# Patient Record
Sex: FEMALE | Race: ASIAN | Employment: UNEMPLOYED | URBAN - METROPOLITAN AREA
[De-identification: names, ages, dates, MRNs, and addresses within clinical notes are randomized per-mention and may not be internally consistent; named-entity substitution may affect disease eponyms.]

---

## 2021-09-17 ENCOUNTER — OFFICE VISIT (OUTPATIENT)
Dept: FAMILY MEDICINE | Facility: CLINIC | Age: 61
End: 2021-09-17
Payer: COMMERCIAL

## 2021-09-17 VITALS
OXYGEN SATURATION: 97 % | BODY MASS INDEX: 19.54 KG/M2 | DIASTOLIC BLOOD PRESSURE: 94 MMHG | TEMPERATURE: 97.6 F | HEIGHT: 61 IN | SYSTOLIC BLOOD PRESSURE: 148 MMHG | HEART RATE: 69 BPM | WEIGHT: 103.5 LBS

## 2021-09-17 DIAGNOSIS — Z00.00 WELLNESS EXAMINATION: Primary | ICD-10-CM

## 2021-09-17 DIAGNOSIS — Z12.11 COLON CANCER SCREENING: ICD-10-CM

## 2021-09-17 DIAGNOSIS — I10 BENIGN ESSENTIAL HYPERTENSION: ICD-10-CM

## 2021-09-17 LAB
ALBUMIN SERPL-MCNC: 3.9 G/DL (ref 3.4–5)
ALP SERPL-CCNC: 89 U/L (ref 40–150)
ALT SERPL W P-5'-P-CCNC: 27 U/L (ref 0–50)
ANION GAP SERPL CALCULATED.3IONS-SCNC: 5 MMOL/L (ref 3–14)
AST SERPL W P-5'-P-CCNC: 19 U/L (ref 0–45)
BASOPHILS # BLD AUTO: 0 10E3/UL (ref 0–0.2)
BASOPHILS NFR BLD AUTO: 0 %
BILIRUB SERPL-MCNC: 0.4 MG/DL (ref 0.2–1.3)
BUN SERPL-MCNC: 15 MG/DL (ref 7–30)
CALCIUM SERPL-MCNC: 9.7 MG/DL (ref 8.5–10.1)
CHLORIDE BLD-SCNC: 108 MMOL/L (ref 94–109)
CHOLEST SERPL-MCNC: 205 MG/DL
CLUE CELLS: ABNORMAL
CO2 SERPL-SCNC: 28 MMOL/L (ref 20–32)
CREAT SERPL-MCNC: 0.67 MG/DL (ref 0.52–1.04)
EOSINOPHIL # BLD AUTO: 0.3 10E3/UL (ref 0–0.7)
EOSINOPHIL NFR BLD AUTO: 4 %
ERYTHROCYTE [DISTWIDTH] IN BLOOD BY AUTOMATED COUNT: 11.8 % (ref 10–15)
FASTING STATUS PATIENT QL REPORTED: YES
GFR SERPL CREATININE-BSD FRML MDRD: >90 ML/MIN/1.73M2
GLUCOSE BLD-MCNC: 96 MG/DL (ref 70–99)
HBV CORE IGM SERPL QL IA: NONREACTIVE
HBV SURFACE AG SERPL QL IA: NONREACTIVE
HCT VFR BLD AUTO: 36.4 % (ref 35–47)
HCV AB SERPL QL IA: NONREACTIVE
HDLC SERPL-MCNC: 39 MG/DL
HGB BLD-MCNC: 12.2 G/DL (ref 11.7–15.7)
HIV 1+2 AB+HIV1 P24 AG SERPL QL IA: NONREACTIVE
LDLC SERPL CALC-MCNC: 141 MG/DL
LYMPHOCYTES # BLD AUTO: 2.2 10E3/UL (ref 0.8–5.3)
LYMPHOCYTES NFR BLD AUTO: 36 %
MCH RBC QN AUTO: 29 PG (ref 26.5–33)
MCHC RBC AUTO-ENTMCNC: 33.5 G/DL (ref 31.5–36.5)
MCV RBC AUTO: 87 FL (ref 78–100)
MONOCYTES # BLD AUTO: 0.5 10E3/UL (ref 0–1.3)
MONOCYTES NFR BLD AUTO: 8 %
NEUTROPHILS # BLD AUTO: 3.2 10E3/UL (ref 1.6–8.3)
NEUTROPHILS NFR BLD AUTO: 52 %
NONHDLC SERPL-MCNC: 166 MG/DL
PLATELET # BLD AUTO: 264 10E3/UL (ref 150–450)
POTASSIUM BLD-SCNC: 4.1 MMOL/L (ref 3.4–5.3)
PROT SERPL-MCNC: 8.8 G/DL (ref 6.8–8.8)
RBC # BLD AUTO: 4.2 10E6/UL (ref 3.8–5.2)
SODIUM SERPL-SCNC: 141 MMOL/L (ref 133–144)
TRICHOMONAS, WET PREP: ABNORMAL
TRIGL SERPL-MCNC: 127 MG/DL
TSH SERPL DL<=0.005 MIU/L-ACNC: 2.32 MU/L (ref 0.4–4)
WBC # BLD AUTO: 6.2 10E3/UL (ref 4–11)
WBC'S/HIGH POWER FIELD, WET PREP: ABNORMAL
YEAST, WET PREP: ABNORMAL

## 2021-09-17 PROCEDURE — 80050 GENERAL HEALTH PANEL: CPT | Performed by: NURSE PRACTITIONER

## 2021-09-17 PROCEDURE — G0145 SCR C/V CYTO,THINLAYER,RESCR: HCPCS | Performed by: NURSE PRACTITIONER

## 2021-09-17 PROCEDURE — 36415 COLL VENOUS BLD VENIPUNCTURE: CPT | Performed by: NURSE PRACTITIONER

## 2021-09-17 PROCEDURE — 99386 PREV VISIT NEW AGE 40-64: CPT | Performed by: NURSE PRACTITIONER

## 2021-09-17 PROCEDURE — 87491 CHLMYD TRACH DNA AMP PROBE: CPT | Performed by: NURSE PRACTITIONER

## 2021-09-17 PROCEDURE — 86481 TB AG RESPONSE T-CELL SUSP: CPT | Performed by: NURSE PRACTITIONER

## 2021-09-17 PROCEDURE — 86705 HEP B CORE ANTIBODY IGM: CPT | Performed by: NURSE PRACTITIONER

## 2021-09-17 PROCEDURE — G0124 SCREEN C/V THIN LAYER BY MD: HCPCS | Performed by: PATHOLOGY

## 2021-09-17 PROCEDURE — 87389 HIV-1 AG W/HIV-1&-2 AB AG IA: CPT | Performed by: NURSE PRACTITIONER

## 2021-09-17 PROCEDURE — 80061 LIPID PANEL: CPT | Performed by: NURSE PRACTITIONER

## 2021-09-17 PROCEDURE — 87210 SMEAR WET MOUNT SALINE/INK: CPT | Performed by: NURSE PRACTITIONER

## 2021-09-17 PROCEDURE — 87340 HEPATITIS B SURFACE AG IA: CPT | Performed by: NURSE PRACTITIONER

## 2021-09-17 PROCEDURE — 87591 N.GONORRHOEAE DNA AMP PROB: CPT | Performed by: NURSE PRACTITIONER

## 2021-09-17 PROCEDURE — 86803 HEPATITIS C AB TEST: CPT | Performed by: NURSE PRACTITIONER

## 2021-09-17 PROCEDURE — 87624 HPV HI-RISK TYP POOLED RSLT: CPT | Performed by: NURSE PRACTITIONER

## 2021-09-17 RX ORDER — CAPTOPRIL 25 MG/1
25 TABLET ORAL 3 TIMES DAILY
Qty: 90 TABLET | Refills: 1 | Status: SHIPPED | OUTPATIENT
Start: 2021-09-17 | End: 2021-09-28 | Stop reason: ALTCHOICE

## 2021-09-17 ASSESSMENT — MIFFLIN-ST. JEOR: SCORE: 958.91

## 2021-09-17 NOTE — PROGRESS NOTES
SUBJECTIVE:   CC: Uyen Rodney is an 61 year old woman who presents for preventive health visit with her daughter and . Moved to the  from [facility] 3 months ago.       Patient has been advised of split billing requirements and indicates understanding: Yes  Healthy Habits:    Getting at least 3 servings of Calcium per day:  Yes    Bi-annual eye exam:  NO    Dental care twice a year:  NO    Sleep apnea or symptoms of sleep apnea:  None    Diet:  Regular (no restrictions)    Frequency of exercise:  None    Duration of exercise:  N/A    Taking medications regularly:  No    Barriers to taking medications:  None    Medication side effects:  Not applicable    PHQ-2 Total Score:    Additional concerns today:  No    1.  Breast cancer screening: has never had this done  2.  Cervical cancer screening:has never had this done  3.  Colon cancer screening: has never had this done  4.  Hypertension:  Taking captoril 25 mg every day. BP today elevated at 148/94, has not taken captopril for 2 days.  Sometimes forgets to take the medication.  5. History of thyroid surgery, is not taking any thyroid medication.  6.  Immunizations:  Is not sure about immunizations, her daughter will drop of records on 9/20.      Today's PHQ-2 Score: No flowsheet data found.    Abuse: Current or Past (Physical, Sexual or Emotional) - No  Do you feel safe in your environment? Yes      Social History     Tobacco Use     Smoking status: Never Smoker     Smokeless tobacco: Never Used   Substance Use Topics     Alcohol use: Never     If you drink alcohol do you typically have >3 drinks per day or >7 drinks per week? No    No flowsheet data found.    Reviewed orders with patient.  Reviewed health maintenance and updated orders accordingly - Yes  Lab work is in process  BP Readings from Last 3 Encounters:   09/17/21 (!) 150/98    Wt Readings from Last 3 Encounters:   09/17/21 46.9 kg (103 lb 8 oz)                  Patient Active Problem  "List   Diagnosis     Benign essential hypertension     No past surgical history on file.    Social History     Tobacco Use     Smoking status: Never Smoker     Smokeless tobacco: Never Used   Substance Use Topics     Alcohol use: Never     History reviewed. No pertinent family history.      No current outpatient medications on file.       Breast Cancer Screening:  Any new diagnosis of family breast, ovarian, or bowel cancer? No    FHS-7: No flowsheet data found.    Mammogram Screening: Recommended mammography every 1-2 years with patient discussion and risk factor consideration  Pertinent mammograms are reviewed under the imaging tab.    History of abnormal Pap smear: Has never had a pap in the past.      Reviewed and updated as needed this visit by clinical staff  Tobacco  Allergies      Fam Hx  Soc Hx        Reviewed and updated as needed this visit by Provider                    Review of Systems  CONSTITUTIONAL: NEGATIVE for fever, chills, change in weight  INTEGUMENTARY/SKIN: NEGATIVE for worrisome rashes, moles or lesions  EYES: NEGATIVE for vision changes or irritation  ENT: NEGATIVE for ear, mouth and throat problems  RESP: NEGATIVE for significant cough or SOB  BREAST: NEGATIVE for masses, tenderness or discharge  CV: NEGATIVE for chest pain, palpitations or peripheral edema  GI: NEGATIVE for nausea, abdominal pain, heartburn, or change in bowel habits  : NEGATIVE for unusual urinary or vaginal symptoms. No vaginal bleeding.  MUSCULOSKELETAL: NEGATIVE for significant arthralgias or myalgia  NEURO: NEGATIVE for weakness, dizziness or paresthesias  PSYCHIATRIC: NEGATIVE for changes in mood or affect      OBJECTIVE:   BP (!) 150/98 (BP Location: Right arm, Patient Position: Sitting, Cuff Size: Adult Regular)   Pulse 69   Temp 97.6  F (36.4  C) (Oral)   Ht 1.537 m (5' 0.5\")   Wt 46.9 kg (103 lb 8 oz)   SpO2 97%   BMI 19.88 kg/m    Physical Exam  GENERAL: healthy, alert and no distress  EYES: Eyes " grossly normal to inspection,   HENT: ear canals and TM's normal, nose and mouth without ulcers or lesions  NECK: no adenopathy, no asymmetry, masses, or scars and thyroid normal to palpation  RESP: lungs clear to auscultation - no rales, rhonchi or wheezes  BREAST: normal without masses, tenderness or nipple discharge and no palpable axillary masses or adenopathy  CV: regular rate and rhythm, normal S1 S2, no S3 or S4, no murmur, click or rub, no peripheral edema and peripheral pulses strong  ABDOMEN: soft, nontender, no hepatosplenomegaly, no masses and bowel sounds normal  MS: no gross musculoskeletal defects noted, no edema  SKIN: no suspicious lesions or rashes  NEURO: Normal strength and tone, mentation intact and speech normal  PSYCH: mentation appears normal, affect normal/bright    ASSESSMENT/PLAN:   Uyen was seen today for physical.    Diagnoses and all orders for this visit:    Wellness examination;  Unclear immunization status, daughter will drop off immigration paperwork on 9/21 for provider review.  -     CBC with Platelets & Differential; Future  -     Comprehensive metabolic panel; Future  -     Lipid panel reflex to direct LDL Fasting; Future  -     TSH with free T4 reflex; Future  -     Wet prep - lab collect; Future  -     Pap screen with HPV - recommended age 30 - 65 years  -     Chlamydia trachomatis PCR  -     Neisseria gonorrhoeae PCR  -     Hepatitis B core antibody IgM; Future  -     Hepatitis B surface antigen; Future  -     Hepatitis C Screen Reflex to HCV RNA Quant and Genotype; Future  -     HIV Antigen Antibody Combo; Future  -     Quantiferon-TB Gold Plus  -     Wet prep - lab collect  -     CBC with Platelets & Differential  -     Comprehensive metabolic panel  -     Lipid panel reflex to direct LDL Fasting  -     TSH with free T4 reflex  -     Hepatitis B core antibody IgM  -     Hepatitis B surface antigen  -     Hepatitis C Screen Reflex to HCV RNA Quant and Genotype  -     HIV  "Antigen Antibody Combo    Colon cancer screening  -     Fecal colorectal cancer screen (FIT); Future  -     Fecal colorectal cancer screen (FIT)    Benign essential hypertension:  BP elevated at 148/94, has not taken medication today. Will follow up in 2-4 weeks and change medication.  -     captopril (CAPOTEN) 25 MG tablet; Take 1 tablet (25 mg) by mouth 3 times daily    Patient has been advised of split billing requirements and indicates understanding:   COUNSELING:  Reviewed preventive health counseling, as reflected in patient instructions       Regular exercise       Healthy diet/nutrition    Estimated body mass index is 19.88 kg/m  as calculated from the following:    Height as of this encounter: 1.537 m (5' 0.5\").    Weight as of this encounter: 46.9 kg (103 lb 8 oz).        She reports that she has never smoked. She has never used smokeless tobacco.      Counseling Resources:  ATP IV Guidelines  Pooled Cohorts Equation Calculator  Breast Cancer Risk Calculator  BRCA-Related Cancer Risk Assessment: FHS-7 Tool  FRAX Risk Assessment  ICSI Preventive Guidelines  Dietary Guidelines for Americans, 2010  USDA's MyPlate  ASA Prophylaxis  Lung CA Screening  Follow up in 2-4 weeks.  Susan Haase, APRN [facility]"

## 2021-09-18 LAB
C TRACH DNA SPEC QL NAA+PROBE: NEGATIVE
N GONORRHOEA DNA SPEC QL NAA+PROBE: NEGATIVE

## 2021-09-19 LAB
GAMMA INTERFERON BACKGROUND BLD IA-ACNC: 0.06 IU/ML
M TB IFN-G BLD-IMP: NEGATIVE
M TB IFN-G CD4+ BCKGRND COR BLD-ACNC: 9.94 IU/ML
MITOGEN IGNF BCKGRD COR BLD-ACNC: 0.07 IU/ML
MITOGEN IGNF BCKGRD COR BLD-ACNC: 0.13 IU/ML
QUANTIFERON MITOGEN: 10 IU/ML
QUANTIFERON NIL TUBE: 0.06 IU/ML
QUANTIFERON TB1 TUBE: 0.19 IU/ML
QUANTIFERON TB2 TUBE: 0.13

## 2021-09-23 LAB
BKR LAB AP GYN ADEQUACY: ABNORMAL
BKR LAB AP GYN INTERPRETATION: ABNORMAL
BKR LAB AP HPV REFLEX: ABNORMAL
BKR LAB AP LMP: ABNORMAL
BKR LAB AP PREVIOUS ABNORMAL: ABNORMAL
PATH REPORT.COMMENTS IMP SPEC: ABNORMAL
PATH REPORT.COMMENTS IMP SPEC: ABNORMAL
PATH REPORT.RELEVANT HX SPEC: ABNORMAL

## 2021-09-24 ENCOUNTER — PATIENT OUTREACH (OUTPATIENT)
Dept: FAMILY MEDICINE | Facility: CLINIC | Age: 61
End: 2021-09-24

## 2021-09-24 DIAGNOSIS — R87.613 HSIL (HIGH GRADE SQUAMOUS INTRAEPITHELIAL LESION) ON PAP SMEAR OF CERVIX: ICD-10-CM

## 2021-09-24 PROBLEM — R87.619 ABNORMAL PAP SMEAR OF CERVIX: Status: ACTIVE | Noted: 2021-09-17

## 2021-09-24 LAB
HUMAN PAPILLOMA VIRUS 16 DNA: POSITIVE
HUMAN PAPILLOMA VIRUS 18 DNA: NEGATIVE
HUMAN PAPILLOMA VIRUS FINAL DIAGNOSIS: ABNORMAL
HUMAN PAPILLOMA VIRUS OTHER HR: POSITIVE

## 2021-09-28 ENCOUNTER — VIRTUAL VISIT (OUTPATIENT)
Dept: FAMILY MEDICINE | Facility: CLINIC | Age: 61
End: 2021-09-28
Payer: COMMERCIAL

## 2021-09-28 DIAGNOSIS — R87.613 HSIL (HIGH GRADE SQUAMOUS INTRAEPITHELIAL LESION) ON PAP SMEAR OF CERVIX: ICD-10-CM

## 2021-09-28 DIAGNOSIS — E78.5 HYPERLIPIDEMIA LDL GOAL <130: ICD-10-CM

## 2021-09-28 DIAGNOSIS — I10 BENIGN ESSENTIAL HYPERTENSION: Primary | ICD-10-CM

## 2021-09-28 PROCEDURE — 99213 OFFICE O/P EST LOW 20 MIN: CPT | Mod: 95 | Performed by: NURSE PRACTITIONER

## 2021-09-28 RX ORDER — SIMVASTATIN 10 MG
10 TABLET ORAL AT BEDTIME
Qty: 90 TABLET | Refills: 0 | Status: SHIPPED | OUTPATIENT
Start: 2021-09-28 | End: 2022-01-05

## 2021-09-28 RX ORDER — LISINOPRIL 20 MG/1
20 TABLET ORAL DAILY
Qty: 90 TABLET | Refills: 0 | Status: SHIPPED | OUTPATIENT
Start: 2021-09-28 | End: 2022-01-12

## 2021-09-28 NOTE — PROGRESS NOTES
Uyen is a 61 year old who is being evaluated via a billable telephone visit.      What phone number would you like to be contacted at? [phone]  How would you like to obtain your AVS? Mail a copy    Assessment & Plan     Benign essential hypertension: discontinue captopril start on lisinopril.  - lisinopril (ZESTRIL) 20 MG tablet  Dispense: 90 tablet; Refill: 0    Hyperlipidemia LDL goal <130: start on simvastatin, risks and benefits discussed, return in 3 months for clinic visit and fasting labs.  - simvastatin (ZOCOR) 10 MG tablet  Dispense: 90 tablet; Refill: 0    HSIL on Pap:  Scheduled for Pap on 10/19/2021.   [id number]     Follow up 12/28 with PCP    Susan Haase, APRN [facility]   Uyen is a 61 year old who presents with a [place]  and son in law for the following health issues   HPI     Hypertension;  Is on captopril, taking 25 mg daily, BP at last visit was elevated.    Hyperlipidemia:  ASCVD risk of  8.9%.       Cervical cancer screening:  HSIL with HPV positive.         Review of Systems   CONSTITUTIONAL: NEGATIVE for fever, chills, change in weight  RESP: NEGATIVE for significant cough or SOB  CV: NEGATIVE for chest pain, palpitations or peripheral edema      Objective           Vitals:  No vitals were obtained today due to virtual visit.    Physical Exam     PSYCH: Alert and oriented times 3; RESP: No cough, no audible wheezing, able to talk in full sentences  Remainder of exam unable to be completed due to telephone visits        Phone call duration: 20 minutes

## 2021-10-19 ENCOUNTER — OFFICE VISIT (OUTPATIENT)
Dept: OBGYN | Facility: CLINIC | Age: 61
End: 2021-10-19
Payer: COMMERCIAL

## 2021-10-19 DIAGNOSIS — R87.613 HSIL (HIGH GRADE SQUAMOUS INTRAEPITHELIAL LESION) ON PAP SMEAR OF CERVIX: Primary | ICD-10-CM

## 2021-10-19 PROCEDURE — 57454 BX/CURETT OF CERVIX W/SCOPE: CPT | Performed by: OBSTETRICS & GYNECOLOGY

## 2021-10-19 PROCEDURE — 88305 TISSUE EXAM BY PATHOLOGIST: CPT | Performed by: PATHOLOGY

## 2021-10-19 NOTE — PROGRESS NOTES
Colposcopy Procedure Note  10/19/2021     INDICATIONS:                                                    Is a pregnancy test required: No.  Was a consent obtained?  Yes    Uyen Rodney, is a 61 year old female with a history of abnormal pap smear, see history below.  Here today for colposcopy. Discussed indication, risks of infection and bleeding.  Discussed role of HPV in the development of abnormal pap smears and cervical cancer.  Discussed immune system support (healthy diet, adequate sleep, non smoking) as methods to help clear the HPV.    History of abnormal pap smears:  9/17/21 HSIL, +HPV 16 & other HR type. Plan [place] bef 12/17/21    PROCEDURE:                                                      Speculum inserted and was somewhat difficult to maintain visualization throughout the procedure, due to a possible tethering of the anterior fornix versus attenuated cervix  Cervix is stained with acetic acid and viewed colposcopically.   Squamocolumnar junction is visualized in its entirity.   acetowhite lesions, abnormal vessels, concern for carcinoma noted at 6 o'clock with circumferential acetowhite staining.  +oozing of the friable skin prior to biopsies.   Biopsy done: Yes. At 3, 6, 9, 12 o clock  Endocervical curettage: Done.  Silver nitrate was applied for hemostasis.    Bimanual exam revealed a small uterus slightly tethered on the anterior aspect, no obvious large masses palpable.  Slight prolapse of the anterior/posterior compartments.      POST PROCEDURE:                                                      IMPRESSION: ARMANDO 3    PLAN : Await the results of the biopsies.  Likely will need further procedure such as LEEP/cone and we briefly reviewed the LEEP procedure in clinic today.  There were no complications. Patient was discharged in stable condition.    Patient advised to call the clinic if excessive bleeding, pelvic pain, or fever.     Follow-up plan based on pathology results.    Karne RODRIGUEZ  MD Asya    1. HSIL (high grade squamous intraepithelial lesion) on Pap smear of cervix  - COLP CERVIX/UPPER VAGINA W BX CERVIX/ENDOCERV CURETT   - surg path    Karen Sky MD, MPH  [facility]

## 2021-10-21 LAB
PATH REPORT.COMMENTS IMP SPEC: NORMAL
PATH REPORT.FINAL DX SPEC: NORMAL
PATH REPORT.GROSS SPEC: NORMAL
PATH REPORT.MICROSCOPIC SPEC OTHER STN: NORMAL
PATH REPORT.RELEVANT HX SPEC: NORMAL
PHOTO IMAGE: NORMAL

## 2021-10-25 ENCOUNTER — APPOINTMENT (OUTPATIENT)
Dept: INTERPRETER SERVICES | Facility: CLINIC | Age: 61
End: 2021-10-25
Payer: COMMERCIAL

## 2021-10-26 ENCOUNTER — PATIENT OUTREACH (OUTPATIENT)
Dept: OBGYN | Facility: CLINIC | Age: 61
End: 2021-10-26
Payer: COMMERCIAL

## 2021-10-26 DIAGNOSIS — R87.613 HSIL (HIGH GRADE SQUAMOUS INTRAEPITHELIAL LESION) ON PAP SMEAR OF CERVIX: Primary | ICD-10-CM

## 2021-10-26 DIAGNOSIS — D06.9 CIN III WITH SEVERE DYSPLASIA: ICD-10-CM

## 2021-10-27 ENCOUNTER — TELEPHONE (OUTPATIENT)
Dept: OBGYN | Facility: CLINIC | Age: 61
End: 2021-10-27

## 2021-10-27 DIAGNOSIS — Z01.818 ENCOUNTER FOR PRE-OPERATIVE EXAMINATION: Primary | ICD-10-CM

## 2021-10-27 NOTE — TELEPHONE ENCOUNTER
Type of surgery: colposcopy, leep  Location of surgery: [facility]  Date and time of surgery: 11/10/21 @ 9:15 am  Surgeon: Dr. Yao Sky  Pre-Op Appt Date: Patient advised to schedule.  Post-Op Appt Date:    Packet sent out: Yes  Pre-cert/Authorization completed:  No  Date: 10/27/21

## 2021-10-27 NOTE — TELEPHONE ENCOUNTER
Procedure name(s) - multi select colposcopy, LEEP   Reason for procedure CIN3   Is this a multi surgeon case? No   Laterality N/A   Request for additional equipment Other (see comments)   Comment: None   Anesthesia Choice   Initiate Pre-op orders for above procedure: Yes, as ordered in Epic   Comment: Additional orders noted there also   Location of Case: [facility]   Surgeon Procedure Time (incision to closure) in minutes (per procedure as applicable) 25   Note:  Surgical Case Time Needed (in minutes)   Patient Class (for admit prior to surgery, specify number of days in comments): Same day (surgery center outpatient)   Vendor Needed? No   Other/Additional Comments, as needed: can be ASC or main OR.

## 2021-11-05 ENCOUNTER — LAB (OUTPATIENT)
Dept: LAB | Facility: CLINIC | Age: 61
End: 2021-11-05
Attending: OBSTETRICS & GYNECOLOGY
Payer: COMMERCIAL

## 2021-11-05 DIAGNOSIS — Z01.818 ENCOUNTER FOR PRE-OPERATIVE EXAMINATION: ICD-10-CM

## 2021-11-05 PROCEDURE — U0005 INFEC AGEN DETEC AMPLI PROBE: HCPCS

## 2021-11-05 PROCEDURE — U0003 INFECTIOUS AGENT DETECTION BY NUCLEIC ACID (DNA OR RNA); SEVERE ACUTE RESPIRATORY SYNDROME CORONAVIRUS 2 (SARS-COV-2) (CORONAVIRUS DISEASE [COVID-19]), AMPLIFIED PROBE TECHNIQUE, MAKING USE OF HIGH THROUGHPUT TECHNOLOGIES AS DESCRIBED BY CMS-2020-01-R: HCPCS

## 2021-11-06 LAB — SARS-COV-2 RNA RESP QL NAA+PROBE: NEGATIVE

## 2021-11-08 ENCOUNTER — OFFICE VISIT (OUTPATIENT)
Dept: FAMILY MEDICINE | Facility: CLINIC | Age: 61
End: 2021-11-08
Payer: COMMERCIAL

## 2021-11-08 VITALS
HEIGHT: 61 IN | WEIGHT: 104 LBS | DIASTOLIC BLOOD PRESSURE: 82 MMHG | BODY MASS INDEX: 19.63 KG/M2 | HEART RATE: 72 BPM | SYSTOLIC BLOOD PRESSURE: 124 MMHG | OXYGEN SATURATION: 99 % | TEMPERATURE: 97.6 F

## 2021-11-08 DIAGNOSIS — R87.613 HSIL (HIGH GRADE SQUAMOUS INTRAEPITHELIAL LESION) ON PAP SMEAR OF CERVIX: ICD-10-CM

## 2021-11-08 DIAGNOSIS — Z01.818 PREOP GENERAL PHYSICAL EXAM: Primary | ICD-10-CM

## 2021-11-08 LAB
ANION GAP SERPL CALCULATED.3IONS-SCNC: 5 MMOL/L (ref 3–14)
BUN SERPL-MCNC: 13 MG/DL (ref 7–30)
CALCIUM SERPL-MCNC: 9.2 MG/DL (ref 8.5–10.1)
CHLORIDE BLD-SCNC: 107 MMOL/L (ref 94–109)
CO2 SERPL-SCNC: 26 MMOL/L (ref 20–32)
CREAT SERPL-MCNC: 0.7 MG/DL (ref 0.52–1.04)
GFR SERPL CREATININE-BSD FRML MDRD: >90 ML/MIN/1.73M2
GLUCOSE BLD-MCNC: 101 MG/DL (ref 70–99)
HGB BLD-MCNC: 11.8 G/DL (ref 11.7–15.7)
POTASSIUM BLD-SCNC: 4.2 MMOL/L (ref 3.4–5.3)
SODIUM SERPL-SCNC: 138 MMOL/L (ref 133–144)

## 2021-11-08 PROCEDURE — 99214 OFFICE O/P EST MOD 30 MIN: CPT | Performed by: PHYSICIAN ASSISTANT

## 2021-11-08 PROCEDURE — 36415 COLL VENOUS BLD VENIPUNCTURE: CPT | Performed by: PHYSICIAN ASSISTANT

## 2021-11-08 PROCEDURE — 85018 HEMOGLOBIN: CPT | Performed by: PHYSICIAN ASSISTANT

## 2021-11-08 PROCEDURE — 80048 BASIC METABOLIC PNL TOTAL CA: CPT | Performed by: PHYSICIAN ASSISTANT

## 2021-11-08 ASSESSMENT — MIFFLIN-ST. JEOR: SCORE: 961.18

## 2021-11-08 NOTE — PROGRESS NOTES
[facility]  [address] [place] [id number]  Phone: [phone]  Primary Provider: Haase, Susan Rachele  Pre-op Performing Provider:    MARGARITA DAVIES  VIDEO,       PREOPERATIVE EVALUATION:  Today's date: 11/8/2021    Uyen Rodney is a 61 year old female who presents for a preoperative evaluation.    Surgical Information:  Surgery/Procedure: hysterectomy  Surgery Location: [facility]  Surgeon: Asya  Surgery Date: 11/10/21  Time of Surgery: 9:15am  Where patient plans to recover: At home with family  Fax number for surgical facility: Note does not need to be faxed, will be available electronically in Epic.    Type of Anesthesia Anticipated: General    Assessment & Plan     The proposed surgical procedure is considered INTERMEDIATE risk.    Preop general physical exam    Cleared for surgery.    - Hemoglobin; Future  - Basic metabolic panel  (Ca, Cl, CO2, Creat, Gluc, K, Na, BUN); Future  - Hemoglobin  - Basic metabolic panel  (Ca, Cl, CO2, Creat, Gluc, K, Na, BUN)      HSIL (high grade squamous intraepithelial lesion) on Pap smear of cervix    Surgery as planned.           Risks and Recommendations:  The patient has the following additional risks and recommendations for perioperative complications:   - No identified additional risk factors other than previously addressed    Medication Instructions:   - ACE/ARB: May be continued on the day of surgery.    - Statins: Continue taking on the day of surgery.     RECOMMENDATION:  APPROVAL GIVEN to proceed with proposed procedure, without further diagnostic evaluation.                        Subjective     HPI related to upcoming procedure: Abnormal pap smear      Preop Questions 11/8/2021   1. Have you ever had a heart attack or stroke? No   2. Have you ever had surgery on your heart or blood vessels, such as a stent placement, a coronary artery bypass, or surgery on an artery in your head, neck,  heart, or legs? No   3. Do you have chest pain with activity? No   4. Do you have a history of  heart failure? No   5. Do you currently have a cold, bronchitis or symptoms of other infection? No   6. Do you have a cough, shortness of breath, or wheezing? No   7. Do you or anyone in your family have previous history of blood clots? No   8. Do you or does anyone in your family have a serious bleeding problem such as prolonged bleeding following surgeries or cuts? No   9. Have you ever had problems with anemia or been told to take iron pills? No   10. Have you had any abnormal blood loss such as black, tarry or bloody stools, or abnormal vaginal bleeding? No   11. Have you ever had a blood transfusion? No   12. Are you willing to have a blood transfusion if it is medically needed before, during, or after your surgery? Yes   13. Have you or any of your relatives ever had problems with anesthesia? No   14. Do you have sleep apnea, excessive snoring or daytime drowsiness? No   15. Do you have any artifical heart valves or other implanted medical devices like a pacemaker, defibrillator, or continuous glucose monitor? No   16. Do you have artificial joints? No   17. Are you allergic to latex? No     Health Care Directive:  Patient does not have a Health Care Directive or Living Will: Discussed advance care planning with patient; however, patient declined at this time.    Preoperative Review of :   reviewed - no record of controlled substances prescribed.      Status of Chronic Conditions:  HYPERTENSION - Patient has longstanding history of HTN , currently denies any symptoms referable to elevated blood pressure. Specifically denies chest pain, palpitations, dyspnea, orthopnea, PND or peripheral edema. Blood pressure readings have been in normal range. Current medication regimen is as listed below. Patient denies any side effects of medication.       Review of Systems  CONSTITUTIONAL: NEGATIVE for fever, chills, change  in weight  INTEGUMENTARY/SKIN: NEGATIVE for worrisome rashes, moles or lesions  EYES: NEGATIVE for vision changes or irritation  ENT/MOUTH: NEGATIVE for ear, mouth and throat problems  RESP: NEGATIVE for significant cough or SOB  CV: NEGATIVE for chest pain, palpitations or peripheral edema  GI: NEGATIVE for nausea, abdominal pain, heartburn, or change in bowel habits  : NEGATIVE for frequency, dysuria, or hematuria  MUSCULOSKELETAL: NEGATIVE for significant arthralgias or myalgia  NEURO: NEGATIVE for weakness, dizziness or paresthesias  ENDOCRINE: NEGATIVE for temperature intolerance, skin/hair changes  HEME: NEGATIVE for bleeding problems  PSYCHIATRIC: NEGATIVE for changes in mood or affect    Patient Active Problem List    Diagnosis Date Noted     Hyperlipidemia LDL goal <130 09/28/2021     Priority: Medium     The 10-year ASCVD risk score (Ramona MONSALVE Jr., et al., 2013) is: 8.9%    Values used to calculate the score:      Age: 61 years      Sex: Female      Is Non- : No      Diabetic: No      Tobacco smoker: No      Systolic Blood Pressure: 148 mmHg      Is BP treated: Yes      HDL Cholesterol: 39 mg/dL      Total Cholesterol: 205 mg/dL         Benign essential hypertension 09/17/2021     Priority: Medium     HSIL (high grade squamous intraepithelial lesion) on Pap smear of cervix 09/17/2021     Priority: Medium     9/17/21 HSIL, +HPV 16 & other HR type. Plan [place] bef 12/17/21 9/24/21 Left message with help from interpretor to call back  9/28/21 Visit with PCP: Pap/HPV results discussed with patient / result letter sent with [place] instructions  10/19/21 [place]: Cervical Bx's x 4 = ARMANDO 3. ECC = ARMANDO 3.  Plan LEEP        Past Medical History:   Diagnosis Date     Abnormal Pap smear of cervix 09/17/2021     Cervical high risk HPV (human papillomavirus) test positive 09/17/2021     History reviewed. No pertinent surgical history.  Current Outpatient Medications   Medication Sig Dispense Refill      "lisinopril (ZESTRIL) 20 MG tablet Take 1 tablet (20 mg) by mouth daily 90 tablet 0     simvastatin (ZOCOR) 10 MG tablet Take 1 tablet (10 mg) by mouth At Bedtime 90 tablet 0       No Known Allergies     Social History     Tobacco Use     Smoking status: Never Smoker     Smokeless tobacco: Never Used   Substance Use Topics     Alcohol use: Never     History reviewed. No pertinent family history.  History   Drug Use Unknown         Objective     /82 (BP Location: Right arm, Patient Position: Sitting, Cuff Size: Adult Regular)   Pulse 72   Temp 97.6  F (36.4  C) (Oral)   Ht 1.537 m (5' 0.5\")   Wt 47.2 kg (104 lb)   SpO2 99%   BMI 19.98 kg/m        Physical Exam    GENERAL APPEARANCE: healthy, alert and no distress     EYES: EOMI, PERRL     HENT: ear canals and TM's normal and nose and mouth without ulcers or lesions     NECK: no adenopathy, no asymmetry, masses, or scars and thyroid normal to palpation     RESP: lungs clear to auscultation - no rales, rhonchi or wheezes     CV: regular rates and rhythm, normal S1 S2, no S3 or S4 and no murmur, click or rub     ABDOMEN:  soft, nontender, no HSM or masses and bowel sounds normal     MS: extremities normal- no gross deformities noted, no evidence of inflammation in joints, FROM in all extremities.     SKIN: no suspicious lesions or rashes     NEURO: Normal strength and tone, sensory exam grossly normal, mentation intact and speech normal     PSYCH: mentation appears normal. and affect normal/bright     LYMPHATICS: No cervical adenopathy    Recent Labs   Lab Test 09/17/21  0948   HGB 12.2         POTASSIUM 4.1   CR 0.67        Diagnostics:  Recent Results (from the past 24 hour(s))   Hemoglobin    Collection Time: 11/08/21  8:10 AM   Result Value Ref Range    Hemoglobin 11.8 11.7 - 15.7 g/dL   Basic metabolic panel  (Ca, Cl, CO2, Creat, Gluc, K, Na, BUN)    Collection Time: 11/08/21  8:10 AM   Result Value Ref Range    Sodium 138 133 - 144 mmol/L "    Potassium 4.2 3.4 - 5.3 mmol/L    Chloride 107 94 - 109 mmol/L    Carbon Dioxide (CO2) 26 20 - 32 mmol/L    Anion Gap 5 3 - 14 mmol/L    Urea Nitrogen 13 7 - 30 mg/dL    Creatinine 0.70 0.52 - 1.04 mg/dL    Calcium 9.2 8.5 - 10.1 mg/dL    Glucose 101 (H) 70 - 99 mg/dL    GFR Estimate >90 >60 mL/min/1.73m2      No EKG required, no history of coronary heart disease, significant arrhythmia, peripheral arterial disease or other structural heart disease.    Revised Cardiac Risk Index (RCRI):  The patient has the following serious cardiovascular risks for perioperative complications:   - No serious cardiac risks = 0 points     RCRI Interpretation: 0 points: Class I (very low risk - 0.4% complication rate)           Signed Electronically by: Servando Domingo PA-C  Copy of this evaluation report is provided to requesting physician.

## 2021-11-08 NOTE — PATIENT INSTRUCTIONS
OK to take medication the morning of surgery with a small sip of water.      Preparing for Your Surgery  Getting started  A nurse will call you to review your health history and instructions. They will give you an arrival time based on your scheduled surgery time.  Please be ready to share the following:    Your doctor's clinic name and phone number    Your medical, surgical and anesthesia history    A list of allergies and sensitivities    A list of medicines, including herbal treatments and over-the-counter drugs    Whether the patient has a legal guardian (ask how to send us the papers in advance)  If you have a child who's having surgery, please ask for a copy of Preparing for Your Child's Surgery.    Preparing for surgery    Within 30 days of surgery: Have a pre-op exam (sometimes called an H&P, or History and Physical). This can be done at a clinic or pre-operative center.  ? If you're having a , you may not need this exam. Talk to your care team    At your pre-op exam, talk to your care team about all medicines you take. If you need to stop any medicines before surgery, ask when to start taking them again.  ? We do this for your safety. Many medicines can make you bleed too much during surgery. Some change how well surgery (anesthesia) drugs work.    Call your insurance company to let them know you're having surgery. (If you don't have insurance, call [phone].)    Call your clinic if there's any change in your health. This includes signs of a cold or flu (sore throat, runny nose, cough, rash, fever). It also includes a scrape or scratch near the surgery site.    If you have questions on the day of surgery, call your hospital or surgery center.  Eating and drinking guidelines  For your safety: Unless your surgeon tells you otherwise, follow the guidelines below.    Eat and drink as usual until 8 hours before surgery. After that, no food or milk.    Drink clear liquids until 2 hours before  surgery. These are liquids you can see through, like water, Gatorade and Propel Water. You may also have black coffee and tea (no cream or milk).    Nothing by mouth within 2 hours of surgery. This includes gum, candy and breath mints.    If you drink, stop drinking alcohol the night before surgery.    If your care team tells you to take medicine on the morning of surgery, it's okay to take it with a sip of water.  Preventing infection    Shower or bathe the night before and morning of your surgery. Follow the instructions your clinic gave you. (If no instructions, use regular soap.)    Don't shave or clip hair near your surgery site. We'll remove the hair if needed.    Don't smoke or vape the morning of surgery. You may chew nicotine gum up to 2 hours before surgery. A nicotine patch is okay.  ? Note: Some surgeries require you to completely quit smoking and nicotine. Check with your surgeon.    Your care team will make every effort to keep you safe from infection. We will:  ? Clean our hands often with soap and water (or an alcohol-based hand rub).  ? Clean the skin at your surgery site with a special soap that kills germs.  ? Give you a special gown to keep you warm. (Cold raises the risk of infection.)  ? Wear special hair covers, masks, gowns and gloves during surgery.  ? Give antibiotic medicine, if prescribed. Not all surgeries need antibiotics.  What to bring on the day of surgery    Photo ID and insurance card    Copy of your health care directive, if you have one    Glasses and hearing aides (bring cases)  ? You can't wear contacts during surgery    Inhaler and eye drops, if you use them (tell us about these when you arrive)    CPAP machine or breathing device, if you use them    A few personal items, if spending the night    If you have . . .  ? A pacemaker or ICD (cardiac defibrillator): Bring the ID card.  ? An implanted stimulator: Bring the remote control.  ? A legal guardian: Bring a copy of the  certified (court-stamped) guardianship papers.  Please remove any jewelry, including body piercings. Leave jewelry and other valuables at home.  If you're going home the day of surgery  Important: If you don't follow the rules below, we must cancel your surgery.     Arrange for someone to drive you home after surgery. You may not drive, take a taxi or take public transportation by yourself (unless you'll have local anesthesia only).    Arrange for a responsible adult to stay with you overnight. If you don't, we may keep you in the hospital overnight, and you may need to pay the costs yourself.  Questions?   If you have any questions for your care team, list them here: _________________________________________________________________________________________________________________________________________________________________________________________________________________________________________________________________________________________________________________________  For informational purposes only. Not to replace the advice of your health care provider. Copyright   2003, 2019 [employer]. All rights reserved. Clinically reviewed by Fernanda Ling MD. [employer] 143497 - REV 4/20.

## 2021-11-08 NOTE — LETTER
November 10, 2021      Uyen Rodney  [address] [address] [facility] [address]        Dear ,    We are writing to inform you of your test results.    Your test results fall within the expected range(s) or remain unchanged from previous results.  Please continue with current treatment plan.    Resulted Orders   Hemoglobin   Result Value Ref Range    Hemoglobin 11.8 11.7 - 15.7 g/dL   Basic metabolic panel  (Ca, Cl, CO2, Creat, Gluc, K, Na, BUN)   Result Value Ref Range    Sodium 138 133 - 144 mmol/L    Potassium 4.2 3.4 - 5.3 mmol/L    Chloride 107 94 - 109 mmol/L    Carbon Dioxide (CO2) 26 20 - 32 mmol/L    Anion Gap 5 3 - 14 mmol/L    Urea Nitrogen 13 7 - 30 mg/dL    Creatinine 0.70 0.52 - 1.04 mg/dL    Calcium 9.2 8.5 - 10.1 mg/dL    Glucose 101 (H) 70 - 99 mg/dL    GFR Estimate >90 >60 mL/min/1.73m2      Comment:      As of July 11, 2021, eGFR is calculated by the CKD-EPI creatinine equation, without race adjustment. eGFR can be influenced by muscle mass, exercise, and diet. The reported eGFR is an estimation only and is only applicable if the renal function is stable.       If you have any questions or concerns, please call the clinic at the number listed above.       Sincerely,      Servando Domingo PA-C

## 2021-11-10 ENCOUNTER — LAB REQUISITION (OUTPATIENT)
Dept: LAB | Facility: CLINIC | Age: 61
End: 2021-11-10
Payer: COMMERCIAL

## 2021-11-10 ENCOUNTER — HOSPITAL (OUTPATIENT)
Dept: OBGYN | Facility: CLINIC | Age: 61
End: 2021-11-10
Payer: COMMERCIAL

## 2021-11-10 PROCEDURE — 88305 TISSUE EXAM BY PATHOLOGIST: CPT | Performed by: PATHOLOGY

## 2021-11-10 PROCEDURE — 88305 TISSUE EXAM BY PATHOLOGIST: CPT | Mod: TC,ORL | Performed by: OBSTETRICS & GYNECOLOGY

## 2021-11-10 PROCEDURE — 88307 TISSUE EXAM BY PATHOLOGIST: CPT | Mod: 26 | Performed by: PATHOLOGY

## 2021-11-10 PROCEDURE — 88305 TISSUE EXAM BY PATHOLOGIST: CPT | Mod: 26 | Performed by: PATHOLOGY

## 2021-11-10 NOTE — PROGRESS NOTES
Operative Note    Patient: Uyen Rodney  : 1960  MRN: [id number]    Date of Service: 11/10/2021    Pre-operative diagnosis:  CIN3    Post-operative diagnosis:  Same     Procedure:   Colposcopy  LEEP    Surgeon: Karen Sky MD    Anesthesia: General    EBL: <5cc  Urine: 150cc  Fluids: see anesthesia record    Specimens: anterior cervix, posterior cervix, 3 o clock cervix, top hat, ECC  Complications: none apparent    Findings: diffuse update of acetic acid circumferentially.  The posterior cervix is very attenuated and nearly flush with the vaginal wall, perhaps due to tethering or also just from prolapse.      Indications: Uyen Rodney is a 61 year old female with CIN3 noted on colposcopy, who was recommended to undergo an excisional procedure.  She elected for a LEEP in the operating room with anesthesia.  Discussed risks, benefits, and alternatives to the procedure including risk of infection, bleeding, damage to local organs, blood clots. The patient's questions were answered, understanding confirmed, and the patient signed written informed consent with a Azeri in person .  Also reviewed post operative steps including 6 month follow up if completely excised, possibility of requiring hysterectomy for a positive margin and flush/attenuated cervix, or referral for gyn oncology if finding cancer.       Procedure: The patient was taken to the operating room where she underwent anesthesia and was prepped and draped in the usual fashion in dorsal lithotomy position.  A time-out was performed.  Bladder was emptied.  A coated speculum was placed in the vagina and the cervix visualized. Acetic acid was applied to the vagina and cervix, and then visualized using the colposcope.  Acetowhite staining was noted circumferentially, and there was oozing of friable tissue most notably posterior however this was also flush with the vaginal wall in some areas.  An intracervical block  with marcaine with epi was placed in four quadrants of the cervix.  A medium loop electrode was used and a cone of the cervix was removed, removing anterior, posterior, and 3 o clock indivicually.  A top hat was also performed..  Endocervical curettage was performed. A ball electrode was used on coagulation electrocautery to achieve good hemostasis of the cervix.  Monsell's solution was applied to assure excellent hemostasis.  The patient tolerated the procedure well.  She was given post op instructions which included activity and pelvic restrictions.      Instrument, needle, and sponge counts were correct times 2.  The patient was transferred to the PACU in stable condition.    Karen Sky MD, MPH  [facility]

## 2021-11-11 DIAGNOSIS — R87.613 HSIL (HIGH GRADE SQUAMOUS INTRAEPITHELIAL LESION) ON PAP SMEAR OF CERVIX: Primary | ICD-10-CM

## 2021-12-15 ENCOUNTER — TELEPHONE (OUTPATIENT)
Dept: OBGYN | Facility: CLINIC | Age: 61
End: 2021-12-15

## 2021-12-15 ENCOUNTER — PREP FOR PROCEDURE (OUTPATIENT)
Dept: OBGYN | Facility: CLINIC | Age: 61
End: 2021-12-15

## 2021-12-15 ENCOUNTER — OFFICE VISIT (OUTPATIENT)
Dept: OBGYN | Facility: CLINIC | Age: 61
End: 2021-12-15
Payer: COMMERCIAL

## 2021-12-15 ENCOUNTER — ANCILLARY PROCEDURE (OUTPATIENT)
Dept: ULTRASOUND IMAGING | Facility: CLINIC | Age: 61
End: 2021-12-15
Attending: OBSTETRICS & GYNECOLOGY
Payer: COMMERCIAL

## 2021-12-15 VITALS — DIASTOLIC BLOOD PRESSURE: 78 MMHG | SYSTOLIC BLOOD PRESSURE: 100 MMHG

## 2021-12-15 DIAGNOSIS — D25.9 FIBROID UTERUS: ICD-10-CM

## 2021-12-15 DIAGNOSIS — N83.202 LEFT OVARIAN CYST: ICD-10-CM

## 2021-12-15 DIAGNOSIS — D25.9 UTERINE LEIOMYOMA, UNSPECIFIED LOCATION: ICD-10-CM

## 2021-12-15 DIAGNOSIS — N83.209 OVARIAN CYST: ICD-10-CM

## 2021-12-15 DIAGNOSIS — Z11.59 ENCOUNTER FOR SCREENING FOR OTHER VIRAL DISEASES: ICD-10-CM

## 2021-12-15 DIAGNOSIS — R87.613 HSIL (HIGH GRADE SQUAMOUS INTRAEPITHELIAL LESION) ON PAP SMEAR OF CERVIX: ICD-10-CM

## 2021-12-15 DIAGNOSIS — D06.9 CIN III WITH SEVERE DYSPLASIA: Primary | ICD-10-CM

## 2021-12-15 DIAGNOSIS — R87.613 HSIL (HIGH GRADE SQUAMOUS INTRAEPITHELIAL LESION) ON PAP SMEAR OF CERVIX: Primary | ICD-10-CM

## 2021-12-15 PROCEDURE — 76856 US EXAM PELVIC COMPLETE: CPT | Performed by: OBSTETRICS & GYNECOLOGY

## 2021-12-15 PROCEDURE — 76830 TRANSVAGINAL US NON-OB: CPT | Performed by: OBSTETRICS & GYNECOLOGY

## 2021-12-15 PROCEDURE — 99214 OFFICE O/P EST MOD 30 MIN: CPT | Performed by: OBSTETRICS & GYNECOLOGY

## 2021-12-15 NOTE — PROGRESS NOTES
SUBJECTIVE:                                                   CC:  Patient presents with:  Follow Up: U/S      HPI:  Uyen Rodney is a 61 year old Citizen of [place] speaking patient who presents today to review results from LEEP and ultrasound.    She has a history of ARMANDO 2-3 with positive margins from LEEP  US done this morning (see results below)  Here today with in person  and son-in-low, daughter on the phone  Also noted on recent US today are a fibroid and an ovarian cyst.      Last 3 Pap and HPV Results:   PAP / HPV Latest Ref Rng & Units 9/17/2021   PAP   High-grade squamous intraepithelial lesion (HSIL) with features suspicious for invasion(A)   HPV16 Negative Positive(A)   HPV18 Negative Negative   HRHPV Negative Positive(A)       PMH, PSH, Soc Hx, Fam Hx, Meds, and allergies reviewed in Epic.    OBJECTIVE:     /78 (BP Location: Right arm, Patient Position: Chair, Cuff Size: Adult Regular)   Breastfeeding No     Gen: Healthy appearing thin female, no acute distress, comfortable  Psych: mentation appears normal for situation    Test Results:  Results for orders placed or performed in visit on 12/15/21 (from the past 24 hour(s))   US Pelvic Complete with Transvaginal    Narrative    [facility]etric[facility] Gynecology     ULTRASOUND - PELVIC GYN- Transabdominal and Transvaginal     Referring MD: Karen Sky MD     ===================================     CLINICAL INFORMATION     Indications for ultrasound:   Abnormal pap     LMP: post neema 8 yrs    Hormones: none     Measurements:  Uterus:  5.1 x 4.0 x 3.9  cm     Position is retroverted.  Contour is irregular w/ myomata: 1 Left 2.5 x   2.6 x 2.3 cm and 2 MARIANA hyperechoic 0.8 x 0.5 x 1.0 cm.     Endo cav: 3.32 mm       Smooth/regular/wnl     Right ovary: nv    Left ovary:   1.4 x 2.0 x 1.7 cm Simple cyst 1.3 x 1.1 x 1.1 cm     Cul de sac: no free fluid     ===================================     Complete pelvic ultrasound  using realtime transabdominal and transvaginal   scanning.    Findings:  Normal appearing bladder.  Uterine fibroids, not affecting the uterine cavity  Simple left ovarian cyst(s)  Normal endometrial lining  No free fluid in the cul de sac    Impression:  Simple left ovarian cyst; to ensure resolution or evaluate for growth of   this ovarian cyst, consider repeat ultrasound in 6-8 weeks.  Fibroid uterus.    Karen Sky MD        ASSESSMENT/PLAN:                                                      1. ARMANDO III with severe dysplasia  Discussed options for treatment including continued surveillance versus hysterectomy.  She desires removal and this is reasonable.  Recommend robot assisted total laparoscopic hysterectomy, bilateral salpingectomy.  Discussed risks of surgery including bleeding to the point of requiring blood transfusion, infection, injury to surrounding organs, possibility of needing a larger incision, blood clot, and pneumonia.  Discussed recovery expectations and that this is typically a same-day surgery.  Pt has had all questions answered and has agreed to proceed.  Will meet with PCP for pre-op physical with PCP.  Will sign consent form day of the procedure.  Pre-op orders entered.  In person  facilitated this visit.     2. Left ovarian cyst  See US report    3. Uterine leiomyoma, unspecified location  See US report    Karen Sky MD, MPH  Obstetrics and Gynecology

## 2021-12-15 NOTE — RESULT ENCOUNTER NOTE
Results discussed directly with patient while patient was present during in person visit. Any further details documented in the note.   Karen Sky MD

## 2021-12-15 NOTE — NURSING NOTE
"Chief Complaint   Patient presents with     Follow Up     U/S       Initial /78 (BP Location: Right arm, Patient Position: Chair, Cuff Size: Adult Regular)   Breastfeeding No  Estimated body mass index is 19.98 kg/m  as calculated from the following:    Height as of 11/8/21: 1.537 m (5' 0.5\").    Weight as of 11/8/21: 47.2 kg (104 lb).  BP completed using cuff size: regular    Questioned patient about current smoking habits.  Pt. has never smoked.      No obstetric history on file.    The following HM Due: NONE  Gaby Walton CMA    "

## 2021-12-15 NOTE — TELEPHONE ENCOUNTER
Type of surgery: robotic TLH, BSO  Location of surgery: [facility]  Date and time of surgery: 12/22/21 @ 10:30 am  Surgeon: Dr. Yao Sky  Pre-Op Appt Date: Patient advised to schedule.  Post-Op Appt Date:    Packet sent out: Yes  Pre-cert/Authorization completed:  No  Date: 12/15/22

## 2021-12-19 ENCOUNTER — LAB (OUTPATIENT)
Dept: LAB | Facility: CLINIC | Age: 61
End: 2021-12-19
Attending: OBSTETRICS & GYNECOLOGY
Payer: COMMERCIAL

## 2021-12-19 DIAGNOSIS — Z11.59 ENCOUNTER FOR SCREENING FOR OTHER VIRAL DISEASES: ICD-10-CM

## 2021-12-19 PROCEDURE — U0003 INFECTIOUS AGENT DETECTION BY NUCLEIC ACID (DNA OR RNA); SEVERE ACUTE RESPIRATORY SYNDROME CORONAVIRUS 2 (SARS-COV-2) (CORONAVIRUS DISEASE [COVID-19]), AMPLIFIED PROBE TECHNIQUE, MAKING USE OF HIGH THROUGHPUT TECHNOLOGIES AS DESCRIBED BY CMS-2020-01-R: HCPCS

## 2021-12-20 ENCOUNTER — OFFICE VISIT (OUTPATIENT)
Dept: FAMILY MEDICINE | Facility: CLINIC | Age: 61
End: 2021-12-20
Payer: COMMERCIAL

## 2021-12-20 ENCOUNTER — ANESTHESIA EVENT (OUTPATIENT)
Dept: SURGERY | Facility: CLINIC | Age: 61
End: 2021-12-20
Payer: COMMERCIAL

## 2021-12-20 VITALS
BODY MASS INDEX: 24.85 KG/M2 | WEIGHT: 126.6 LBS | HEIGHT: 60 IN | TEMPERATURE: 97.5 F | OXYGEN SATURATION: 99 % | HEART RATE: 71 BPM | SYSTOLIC BLOOD PRESSURE: 122 MMHG | DIASTOLIC BLOOD PRESSURE: 82 MMHG

## 2021-12-20 DIAGNOSIS — I10 BENIGN ESSENTIAL HYPERTENSION: ICD-10-CM

## 2021-12-20 DIAGNOSIS — Z01.818 PREOP GENERAL PHYSICAL EXAM: Primary | ICD-10-CM

## 2021-12-20 DIAGNOSIS — R87.613 HSIL (HIGH GRADE SQUAMOUS INTRAEPITHELIAL LESION) ON PAP SMEAR OF CERVIX: ICD-10-CM

## 2021-12-20 LAB — SARS-COV-2 RNA RESP QL NAA+PROBE: NEGATIVE

## 2021-12-20 PROCEDURE — 93000 ELECTROCARDIOGRAM COMPLETE: CPT | Performed by: NURSE PRACTITIONER

## 2021-12-20 PROCEDURE — 99214 OFFICE O/P EST MOD 30 MIN: CPT | Performed by: NURSE PRACTITIONER

## 2021-12-20 RX ORDER — CAPTOPRIL 25 MG/1
25 TABLET ORAL DAILY
COMMUNITY
End: 2022-03-28 | Stop reason: ALTCHOICE

## 2021-12-20 ASSESSMENT — MIFFLIN-ST. JEOR: SCORE: 1059.72

## 2021-12-20 NOTE — PROGRESS NOTES
[facility]  [address] [facility] [address]  Phone: [phone]  Primary Provider: Haase, Susan Rachele  Pre-op Performing Provider:    LILY AMAYA  VIDEO,       PREOPERATIVE EVALUATION:  Today's date: 12/20/2021    Uyen Rodney is a 61 year old female who presents for a preoperative evaluation.    Surgical Information:  Surgery/Procedure: hysterectomy, bilateral salpingo-oophorectomy  Surgery Location: [facility]  Surgeon: Karen Sky MD  Surgery Date: 12/22/21   Time of Surgery: 10:35am  Where patient plans to recover: At home with family  Fax number for surgical facility: Note does not need to be faxed, will be available electronically in Epic.    Type of Anesthesia Anticipated: General    Assessment & Plan     The proposed surgical procedure is considered INTERMEDIATE risk.    Preop general physical exam  Cleared as below.   - EKG 12-lead complete w/read - Clinics    HSIL (high grade squamous intraepithelial lesion) on Pap smear of cervix      Benign essential hypertension  Controlled.   - EKG 12-lead complete w/read - Clinics           Risks and Recommendations:  The patient has the following additional risks and recommendations for perioperative complications:   - No identified additional risk factors other than previously addressed    Medication Instructions:  Patient will hold medications day of procedure    RECOMMENDATION:  APPROVAL GIVEN to proceed with proposed procedure, without further diagnostic evaluation.        Subjective     HPI related to upcoming procedure:  hysterectomy, bilateral salpingo-oophorectomy for treatment of   HSIL (high grade squamous intraepithelial lesion) on Pap smear of cervix   Fibroid uterus   Ovarian cyst         Preop Questions 12/20/2021   1. Have you ever had a heart attack or stroke? No   2. Have you ever had surgery on your heart or blood vessels, such as a stent placement, a coronary  artery bypass, or surgery on an artery in your head, neck, heart, or legs? No   3. Do you have chest pain with activity? No   4. Do you have a history of  heart failure? No   5. Do you currently have a cold, bronchitis or symptoms of other infection? No   6. Do you have a cough, shortness of breath, or wheezing? No   7. Do you or anyone in your family have previous history of blood clots? No   8. Do you or does anyone in your family have a serious bleeding problem such as prolonged bleeding following surgeries or cuts? No   9. Have you ever had problems with anemia or been told to take iron pills? No   10. Have you had any abnormal blood loss such as black, tarry or bloody stools, or abnormal vaginal bleeding? No   11. Have you ever had a blood transfusion? No   12. Are you willing to have a blood transfusion if it is medically needed before, during, or after your surgery? Yes   13. Have you or any of your relatives ever had problems with anesthesia? No   14. Do you have sleep apnea, excessive snoring or daytime drowsiness? No   15. Do you have any artifical heart valves or other implanted medical devices like a pacemaker, defibrillator, or continuous glucose monitor? No   16. Do you have artificial joints? No   17. Are you allergic to latex? No     Health Care Directive:  Patient does not have a Health Care Directive or Living Will: Discussed advance care planning with patient; however, patient declined at this time.    Preoperative Review of :   reviewed - no record of controlled substances prescribed.      Status of Chronic Conditions:  See problem list for active medical problems.  Problems all longstanding and stable, except as noted/documented.  See ROS for pertinent symptoms related to these conditions.      Review of Systems  CONSTITUTIONAL: NEGATIVE for fever, chills, change in weight  INTEGUMENTARY/SKIN: NEGATIVE for worrisome rashes, moles or lesions  EYES: NEGATIVE for vision changes or  irritation  ENT/MOUTH: NEGATIVE for ear, mouth and throat problems  RESP: NEGATIVE for significant cough or SOB  CV: NEGATIVE for chest pain, palpitations or peripheral edema  GI: NEGATIVE for nausea, abdominal pain, heartburn, or change in bowel habits  : NEGATIVE for frequency, dysuria, or hematuria  MUSCULOSKELETAL: NEGATIVE for significant arthralgias or myalgia  NEURO: NEGATIVE for weakness, dizziness or paresthesias  ENDOCRINE: NEGATIVE for temperature intolerance, skin/hair changes  HEME: NEGATIVE for bleeding problems  PSYCHIATRIC: NEGATIVE for changes in mood or affect    Patient Active Problem List    Diagnosis Date Noted     Hyperlipidemia LDL goal <130 09/28/2021     Priority: Medium     The 10-year ASCVD risk score (Ramona MONSALVE Jr., et al., 2013) is: 8.9%    Values used to calculate the score:      Age: 61 years      Sex: Female      Is Non- : No      Diabetic: No      Tobacco smoker: No      Systolic Blood Pressure: 148 mmHg      Is BP treated: Yes      HDL Cholesterol: 39 mg/dL      Total Cholesterol: 205 mg/dL         Benign essential hypertension 09/17/2021     Priority: Medium     HSIL (high grade squamous intraepithelial lesion) on Pap smear of cervix 09/17/2021     Priority: Medium     9/17/21 HSIL, +HPV 16 & other HR type. Plan [place] bef 12/17/21 9/24/21 Left message with help from interpretor to call back  9/28/21 Visit with PCP: Pap/HPV results discussed with patient / result letter sent with [place] instructions  10/19/21 [place]: Cervical Bx's x 4 = ARMANDO 3. ECC = ARMANDO 3.  Plan LEEP  12/22/21 Hysterectomy scheduled        Past Medical History:   Diagnosis Date     Abnormal Pap smear of cervix 09/17/2021     Cervical high risk HPV (human papillomavirus) test positive 09/17/2021     History reviewed. No pertinent surgical history.  Current Outpatient Medications   Medication Sig Dispense Refill     lisinopril (ZESTRIL) 20 MG tablet Take 1 tablet (20 mg) by mouth daily 90 tablet 0  "    simvastatin (ZOCOR) 10 MG tablet Take 1 tablet (10 mg) by mouth At Bedtime 90 tablet 0       No Known Allergies     Social History     Tobacco Use     Smoking status: Never Smoker     Smokeless tobacco: Never Used   Substance Use Topics     Alcohol use: Never     History reviewed. No pertinent family history.  History   Drug Use Unknown         Objective     /82 (BP Location: Right arm, Patient Position: Sitting, Cuff Size: Adult Regular)   Pulse 71   Temp 97.5  F (36.4  C) (Oral)   Ht 1.53 m (5' 0.25\")   Wt 57.4 kg (126 lb 9.6 oz)   SpO2 99%   BMI 24.52 kg/m      Physical Exam    GENERAL APPEARANCE: healthy, alert and no distress     EYES: EOMI, PERRL     HENT: ear canals and TM's normal and nose and mouth without ulcers or lesions     NECK: no adenopathy, no asymmetry, masses, or scars and thyroid normal to palpation     RESP: lungs clear to auscultation - no rales, rhonchi or wheezes     CV: regular rates and rhythm, normal S1 S2, no S3 or S4 and no murmur, click or rub     ABDOMEN:  soft, nontender, no HSM or masses and bowel sounds normal     MS: extremities normal- no gross deformities noted, no evidence of inflammation in joints, FROM in all extremities.     SKIN: no suspicious lesions or rashes     NEURO: Normal strength and tone, sensory exam grossly normal, mentation intact and speech normal     PSYCH: mentation appears normal. and affect normal/bright     LYMPHATICS: No cervical adenopathy    Recent Labs   Lab Test 11/08/21  0810 09/17/21  0948   HGB 11.8 12.2   PLT  --  264    141   POTASSIUM 4.2 4.1   CR 0.70 0.67        Diagnostics:  No labs were ordered during this visit.   EKG: appears normal, NSR, unchanged from previous tracings    Revised Cardiac Risk Index (RCRI):  The patient has the following serious cardiovascular risks for perioperative complications:   - No serious cardiac risks = 0 points     RCRI Interpretation: 0 points: Class I (very low risk - 0.4% complication " rate)           Signed Electronically by: PJ Morales CNP  Copy of this evaluation report is provided to requesting physician.

## 2021-12-20 NOTE — PATIENT INSTRUCTIONS

## 2021-12-20 NOTE — H&P (VIEW-ONLY)
[facility]  [address] [address]  Phone: [phone]  Primary Provider: Haase, Susan Rachele  Pre-op Performing Provider:    LILY AMAYA  VIDEO,       PREOPERATIVE EVALUATION:  Today's date: 12/20/2021    Uyen Rodney is a 61 year old female who presents for a preoperative evaluation.    Surgical Information:  Surgery/Procedure: hysterectomy, bilateral salpingo-oophorectomy  Surgery Location: [facility]  Surgeon: Karen Sky MD  Surgery Date: 12/22/21   Time of Surgery: 10:35am  Where patient plans to recover: At home with family  Fax number for surgical facility: Note does not need to be faxed, will be available electronically in Epic.    Type of Anesthesia Anticipated: General    Assessment & Plan     The proposed surgical procedure is considered INTERMEDIATE risk.    Preop general physical exam  Cleared as below.   - EKG 12-lead complete w/read - Clinics    HSIL (high grade squamous intraepithelial lesion) on Pap smear of cervix      Benign essential hypertension  Controlled.   - EKG 12-lead complete w/read - Clinics           Risks and Recommendations:  The patient has the following additional risks and recommendations for perioperative complications:   - No identified additional risk factors other than previously addressed    Medication Instructions:  Patient will hold medications day of procedure    RECOMMENDATION:  APPROVAL GIVEN to proceed with proposed procedure, without further diagnostic evaluation.        Subjective     HPI related to upcoming procedure:  hysterectomy, bilateral salpingo-oophorectomy for treatment of   HSIL (high grade squamous intraepithelial lesion) on Pap smear of cervix   Fibroid uterus   Ovarian cyst         Preop Questions 12/20/2021   1. Have you ever had a heart attack or stroke? No   2. Have you ever had surgery on your heart or blood vessels, such as a stent placement, a coronary  artery bypass, or surgery on an artery in your head, neck, heart, or legs? No   3. Do you have chest pain with activity? No   4. Do you have a history of  heart failure? No   5. Do you currently have a cold, bronchitis or symptoms of other infection? No   6. Do you have a cough, shortness of breath, or wheezing? No   7. Do you or anyone in your family have previous history of blood clots? No   8. Do you or does anyone in your family have a serious bleeding problem such as prolonged bleeding following surgeries or cuts? No   9. Have you ever had problems with anemia or been told to take iron pills? No   10. Have you had any abnormal blood loss such as black, tarry or bloody stools, or abnormal vaginal bleeding? No   11. Have you ever had a blood transfusion? No   12. Are you willing to have a blood transfusion if it is medically needed before, during, or after your surgery? Yes   13. Have you or any of your relatives ever had problems with anesthesia? No   14. Do you have sleep apnea, excessive snoring or daytime drowsiness? No   15. Do you have any artifical heart valves or other implanted medical devices like a pacemaker, defibrillator, or continuous glucose monitor? No   16. Do you have artificial joints? No   17. Are you allergic to latex? No     Health Care Directive:  Patient does not have a Health Care Directive or Living Will: Discussed advance care planning with patient; however, patient declined at this time.    Preoperative Review of :   reviewed - no record of controlled substances prescribed.      Status of Chronic Conditions:  See problem list for active medical problems.  Problems all longstanding and stable, except as noted/documented.  See ROS for pertinent symptoms related to these conditions.      Review of Systems  CONSTITUTIONAL: NEGATIVE for fever, chills, change in weight  INTEGUMENTARY/SKIN: NEGATIVE for worrisome rashes, moles or lesions  EYES: NEGATIVE for vision changes or  irritation  ENT/MOUTH: NEGATIVE for ear, mouth and throat problems  RESP: NEGATIVE for significant cough or SOB  CV: NEGATIVE for chest pain, palpitations or peripheral edema  GI: NEGATIVE for nausea, abdominal pain, heartburn, or change in bowel habits  : NEGATIVE for frequency, dysuria, or hematuria  MUSCULOSKELETAL: NEGATIVE for significant arthralgias or myalgia  NEURO: NEGATIVE for weakness, dizziness or paresthesias  ENDOCRINE: NEGATIVE for temperature intolerance, skin/hair changes  HEME: NEGATIVE for bleeding problems  PSYCHIATRIC: NEGATIVE for changes in mood or affect    Patient Active Problem List    Diagnosis Date Noted     Hyperlipidemia LDL goal <130 09/28/2021     Priority: Medium     The 10-year ASCVD risk score (Ramona MONSALVE Jr., et al., 2013) is: 8.9%    Values used to calculate the score:      Age: 61 years      Sex: Female      Is Non- : No      Diabetic: No      Tobacco smoker: No      Systolic Blood Pressure: 148 mmHg      Is BP treated: Yes      HDL Cholesterol: 39 mg/dL      Total Cholesterol: 205 mg/dL         Benign essential hypertension 09/17/2021     Priority: Medium     HSIL (high grade squamous intraepithelial lesion) on Pap smear of cervix 09/17/2021     Priority: Medium     9/17/21 HSIL, +HPV 16 & other HR type. Plan [place] bef 12/17/21 9/24/21 Left message with help from interpretor to call back  9/28/21 Visit with PCP: Pap/HPV results discussed with patient / result letter sent with [place] instructions  10/19/21 [place]: Cervical Bx's x 4 = ARMANDO 3. ECC = ARMANDO 3.  Plan LEEP  12/22/21 Hysterectomy scheduled        Past Medical History:   Diagnosis Date     Abnormal Pap smear of cervix 09/17/2021     Cervical high risk HPV (human papillomavirus) test positive 09/17/2021     History reviewed. No pertinent surgical history.  Current Outpatient Medications   Medication Sig Dispense Refill     lisinopril (ZESTRIL) 20 MG tablet Take 1 tablet (20 mg) by mouth daily 90 tablet 0  "    simvastatin (ZOCOR) 10 MG tablet Take 1 tablet (10 mg) by mouth At Bedtime 90 tablet 0       No Known Allergies     Social History     Tobacco Use     Smoking status: Never Smoker     Smokeless tobacco: Never Used   Substance Use Topics     Alcohol use: Never     History reviewed. No pertinent family history.  History   Drug Use Unknown         Objective     /82 (BP Location: Right arm, Patient Position: Sitting, Cuff Size: Adult Regular)   Pulse 71   Temp 97.5  F (36.4  C) (Oral)   Ht 1.53 m (5' 0.25\")   Wt 57.4 kg (126 lb 9.6 oz)   SpO2 99%   BMI 24.52 kg/m      Physical Exam    GENERAL APPEARANCE: healthy, alert and no distress     EYES: EOMI, PERRL     HENT: ear canals and TM's normal and nose and mouth without ulcers or lesions     NECK: no adenopathy, no asymmetry, masses, or scars and thyroid normal to palpation     RESP: lungs clear to auscultation - no rales, rhonchi or wheezes     CV: regular rates and rhythm, normal S1 S2, no S3 or S4 and no murmur, click or rub     ABDOMEN:  soft, nontender, no HSM or masses and bowel sounds normal     MS: extremities normal- no gross deformities noted, no evidence of inflammation in joints, FROM in all extremities.     SKIN: no suspicious lesions or rashes     NEURO: Normal strength and tone, sensory exam grossly normal, mentation intact and speech normal     PSYCH: mentation appears normal. and affect normal/bright     LYMPHATICS: No cervical adenopathy    Recent Labs   Lab Test 11/08/21  0810 09/17/21  0948   HGB 11.8 12.2   PLT  --  264    141   POTASSIUM 4.2 4.1   CR 0.70 0.67        Diagnostics:  No labs were ordered during this visit.   EKG: appears normal, NSR, unchanged from previous tracings    Revised Cardiac Risk Index (RCRI):  The patient has the following serious cardiovascular risks for perioperative complications:   - No serious cardiac risks = 0 points     RCRI Interpretation: 0 points: Class I (very low risk - 0.4% complication " rate)           Signed Electronically by: PJ Morales CNP  Copy of this evaluation report is provided to requesting physician.

## 2021-12-22 ENCOUNTER — ANESTHESIA (OUTPATIENT)
Dept: SURGERY | Facility: CLINIC | Age: 61
End: 2021-12-22
Payer: COMMERCIAL

## 2021-12-22 ENCOUNTER — HOSPITAL ENCOUNTER (OUTPATIENT)
Facility: CLINIC | Age: 61
Discharge: HOME | End: 2021-12-22
Attending: OBSTETRICS & GYNECOLOGY | Admitting: OBSTETRICS & GYNECOLOGY
Payer: COMMERCIAL

## 2021-12-22 VITALS
BODY MASS INDEX: 24.54 KG/M2 | HEIGHT: 60 IN | RESPIRATION RATE: 16 BRPM | DIASTOLIC BLOOD PRESSURE: 93 MMHG | WEIGHT: 125 LBS | SYSTOLIC BLOOD PRESSURE: 154 MMHG | HEART RATE: 94 BPM | TEMPERATURE: 97.6 F | OXYGEN SATURATION: 97 %

## 2021-12-22 DIAGNOSIS — Z90.710 S/P HYSTERECTOMY: Primary | ICD-10-CM

## 2021-12-22 LAB
ABO/RH(D): NORMAL
ANTIBODY SCREEN: NEGATIVE
CREAT SERPL-MCNC: 0.58 MG/DL (ref 0.52–1.04)
GFR SERPL CREATININE-BSD FRML MDRD: >90 ML/MIN/1.73M2
HGB BLD-MCNC: 12.2 G/DL (ref 11.7–15.7)
SPECIMEN EXPIRATION DATE: NORMAL

## 2021-12-22 PROCEDURE — S2900 ROBOTIC SURGICAL SYSTEM: HCPCS | Performed by: OBSTETRICS & GYNECOLOGY

## 2021-12-22 PROCEDURE — 86901 BLOOD TYPING SEROLOGIC RH(D): CPT | Performed by: ANESTHESIOLOGY

## 2021-12-22 PROCEDURE — 999N000141 HC STATISTIC PRE-PROCEDURE NURSING ASSESSMENT: Performed by: OBSTETRICS & GYNECOLOGY

## 2021-12-22 PROCEDURE — 710N000009 HC RECOVERY PHASE 1, LEVEL 1, PER MIN: Performed by: OBSTETRICS & GYNECOLOGY

## 2021-12-22 PROCEDURE — 250N000013 HC RX MED GY IP 250 OP 250 PS 637: Performed by: ANESTHESIOLOGY

## 2021-12-22 PROCEDURE — 360N000080 HC SURGERY LEVEL 7, PER MIN: Performed by: OBSTETRICS & GYNECOLOGY

## 2021-12-22 PROCEDURE — 250N000013 HC RX MED GY IP 250 OP 250 PS 637: Performed by: OBSTETRICS & GYNECOLOGY

## 2021-12-22 PROCEDURE — 272N000001 HC OR GENERAL SUPPLY STERILE: Performed by: OBSTETRICS & GYNECOLOGY

## 2021-12-22 PROCEDURE — 250N000009 HC RX 250: Performed by: OBSTETRICS & GYNECOLOGY

## 2021-12-22 PROCEDURE — 370N000017 HC ANESTHESIA TECHNICAL FEE, PER MIN: Performed by: OBSTETRICS & GYNECOLOGY

## 2021-12-22 PROCEDURE — 85018 HEMOGLOBIN: CPT | Performed by: ANESTHESIOLOGY

## 2021-12-22 PROCEDURE — 250N000009 HC RX 250: Performed by: NURSE ANESTHETIST, CERTIFIED REGISTERED

## 2021-12-22 PROCEDURE — 710N000012 HC RECOVERY PHASE 2, PER MINUTE: Performed by: OBSTETRICS & GYNECOLOGY

## 2021-12-22 PROCEDURE — 250N000011 HC RX IP 250 OP 636: Performed by: OBSTETRICS & GYNECOLOGY

## 2021-12-22 PROCEDURE — 258N000001 HC RX 258: Performed by: OBSTETRICS & GYNECOLOGY

## 2021-12-22 PROCEDURE — 258N000003 HC RX IP 258 OP 636: Performed by: NURSE ANESTHETIST, CERTIFIED REGISTERED

## 2021-12-22 PROCEDURE — 258N000003 HC RX IP 258 OP 636: Performed by: ANESTHESIOLOGY

## 2021-12-22 PROCEDURE — 88307 TISSUE EXAM BY PATHOLOGIST: CPT | Mod: TC | Performed by: OBSTETRICS & GYNECOLOGY

## 2021-12-22 PROCEDURE — 82565 ASSAY OF CREATININE: CPT | Performed by: ANESTHESIOLOGY

## 2021-12-22 PROCEDURE — 58571 TLH W/T/O 250 G OR LESS: CPT | Performed by: OBSTETRICS & GYNECOLOGY

## 2021-12-22 PROCEDURE — 250N000011 HC RX IP 250 OP 636: Performed by: NURSE ANESTHETIST, CERTIFIED REGISTERED

## 2021-12-22 RX ORDER — IBUPROFEN 800 MG/1
800 TABLET, FILM COATED ORAL EVERY 6 HOURS PRN
Qty: 30 TABLET | Refills: 0 | Status: SHIPPED | OUTPATIENT
Start: 2021-12-22 | End: 2022-03-28

## 2021-12-22 RX ORDER — FENTANYL CITRATE 50 UG/ML
25 INJECTION, SOLUTION INTRAMUSCULAR; INTRAVENOUS EVERY 5 MIN PRN
Status: DISCONTINUED | OUTPATIENT
Start: 2021-12-22 | End: 2021-12-22 | Stop reason: HOSPADM

## 2021-12-22 RX ORDER — FENTANYL CITRATE 50 UG/ML
INJECTION, SOLUTION INTRAMUSCULAR; INTRAVENOUS PRN
Status: DISCONTINUED | OUTPATIENT
Start: 2021-12-22 | End: 2021-12-22

## 2021-12-22 RX ORDER — NEOSTIGMINE METHYLSULFATE 1 MG/ML
VIAL (ML) INJECTION PRN
Status: DISCONTINUED | OUTPATIENT
Start: 2021-12-22 | End: 2021-12-22

## 2021-12-22 RX ORDER — SODIUM CHLORIDE, SODIUM LACTATE, POTASSIUM CHLORIDE, CALCIUM CHLORIDE 600; 310; 30; 20 MG/100ML; MG/100ML; MG/100ML; MG/100ML
INJECTION, SOLUTION INTRAVENOUS CONTINUOUS
Status: DISCONTINUED | OUTPATIENT
Start: 2021-12-22 | End: 2021-12-22 | Stop reason: HOSPADM

## 2021-12-22 RX ORDER — PHENAZOPYRIDINE HYDROCHLORIDE 200 MG/1
200 TABLET, FILM COATED ORAL ONCE
Status: COMPLETED | OUTPATIENT
Start: 2021-12-22 | End: 2021-12-22

## 2021-12-22 RX ORDER — ONDANSETRON 2 MG/ML
INJECTION INTRAMUSCULAR; INTRAVENOUS PRN
Status: DISCONTINUED | OUTPATIENT
Start: 2021-12-22 | End: 2021-12-22

## 2021-12-22 RX ORDER — LIDOCAINE 40 MG/G
CREAM TOPICAL
Status: DISCONTINUED | OUTPATIENT
Start: 2021-12-22 | End: 2021-12-22 | Stop reason: HOSPADM

## 2021-12-22 RX ORDER — KETOROLAC TROMETHAMINE 30 MG/ML
INJECTION, SOLUTION INTRAMUSCULAR; INTRAVENOUS PRN
Status: DISCONTINUED | OUTPATIENT
Start: 2021-12-22 | End: 2021-12-22

## 2021-12-22 RX ORDER — ACETAMINOPHEN 325 MG/1
975 TABLET ORAL ONCE
Status: COMPLETED | OUTPATIENT
Start: 2021-12-22 | End: 2021-12-22

## 2021-12-22 RX ORDER — CEFAZOLIN SODIUM/WATER 2 G/20 ML
2 SYRINGE (ML) INTRAVENOUS
Status: DISCONTINUED | OUTPATIENT
Start: 2021-12-22 | End: 2021-12-22 | Stop reason: HOSPADM

## 2021-12-22 RX ORDER — MEPERIDINE HYDROCHLORIDE 25 MG/ML
12.5 INJECTION INTRAMUSCULAR; INTRAVENOUS; SUBCUTANEOUS
Status: DISCONTINUED | OUTPATIENT
Start: 2021-12-22 | End: 2021-12-22 | Stop reason: HOSPADM

## 2021-12-22 RX ORDER — IBUPROFEN 800 MG/1
800 TABLET, FILM COATED ORAL ONCE
Status: DISCONTINUED | OUTPATIENT
Start: 2021-12-22 | End: 2021-12-22 | Stop reason: HOSPADM

## 2021-12-22 RX ORDER — LIDOCAINE HYDROCHLORIDE 20 MG/ML
INJECTION, SOLUTION INFILTRATION; PERINEURAL PRN
Status: DISCONTINUED | OUTPATIENT
Start: 2021-12-22 | End: 2021-12-22

## 2021-12-22 RX ORDER — ONDANSETRON 4 MG/1
4 TABLET, ORALLY DISINTEGRATING ORAL EVERY 30 MIN PRN
Status: DISCONTINUED | OUTPATIENT
Start: 2021-12-22 | End: 2021-12-22 | Stop reason: HOSPADM

## 2021-12-22 RX ORDER — HYDROMORPHONE HCL IN WATER/PF 6 MG/30 ML
0.2 PATIENT CONTROLLED ANALGESIA SYRINGE INTRAVENOUS EVERY 5 MIN PRN
Status: DISCONTINUED | OUTPATIENT
Start: 2021-12-22 | End: 2021-12-22 | Stop reason: HOSPADM

## 2021-12-22 RX ORDER — GLYCOPYRROLATE 0.2 MG/ML
INJECTION, SOLUTION INTRAMUSCULAR; INTRAVENOUS PRN
Status: DISCONTINUED | OUTPATIENT
Start: 2021-12-22 | End: 2021-12-22

## 2021-12-22 RX ORDER — BUPIVACAINE HYDROCHLORIDE AND EPINEPHRINE 2.5; 5 MG/ML; UG/ML
1-30 INJECTION, SOLUTION EPIDURAL; INFILTRATION; INTRACAUDAL; PERINEURAL ONCE
Status: COMPLETED | OUTPATIENT
Start: 2021-12-22 | End: 2021-12-22

## 2021-12-22 RX ORDER — HYDROCODONE BITARTRATE AND ACETAMINOPHEN 5; 325 MG/1; MG/1
1-2 TABLET ORAL EVERY 4 HOURS PRN
Qty: 15 TABLET | Refills: 0 | Status: SHIPPED | OUTPATIENT
Start: 2021-12-22 | End: 2022-03-28

## 2021-12-22 RX ORDER — ACETAMINOPHEN 325 MG/1
975 TABLET ORAL ONCE
Status: DISCONTINUED | OUTPATIENT
Start: 2021-12-22 | End: 2021-12-22 | Stop reason: HOSPADM

## 2021-12-22 RX ORDER — DEXAMETHASONE SODIUM PHOSPHATE 4 MG/ML
INJECTION, SOLUTION INTRA-ARTICULAR; INTRALESIONAL; INTRAMUSCULAR; INTRAVENOUS; SOFT TISSUE PRN
Status: DISCONTINUED | OUTPATIENT
Start: 2021-12-22 | End: 2021-12-22

## 2021-12-22 RX ORDER — PROPOFOL 10 MG/ML
INJECTION, EMULSION INTRAVENOUS CONTINUOUS PRN
Status: DISCONTINUED | OUTPATIENT
Start: 2021-12-22 | End: 2021-12-22

## 2021-12-22 RX ORDER — PROPOFOL 10 MG/ML
INJECTION, EMULSION INTRAVENOUS PRN
Status: DISCONTINUED | OUTPATIENT
Start: 2021-12-22 | End: 2021-12-22

## 2021-12-22 RX ORDER — FENTANYL CITRATE 50 UG/ML
25 INJECTION, SOLUTION INTRAMUSCULAR; INTRAVENOUS
Status: DISCONTINUED | OUTPATIENT
Start: 2021-12-22 | End: 2021-12-22 | Stop reason: HOSPADM

## 2021-12-22 RX ORDER — ONDANSETRON 2 MG/ML
4 INJECTION INTRAMUSCULAR; INTRAVENOUS EVERY 30 MIN PRN
Status: DISCONTINUED | OUTPATIENT
Start: 2021-12-22 | End: 2021-12-22 | Stop reason: HOSPADM

## 2021-12-22 RX ORDER — CEFAZOLIN SODIUM/WATER 2 G/20 ML
2 SYRINGE (ML) INTRAVENOUS SEE ADMIN INSTRUCTIONS
Status: DISCONTINUED | OUTPATIENT
Start: 2021-12-22 | End: 2021-12-22 | Stop reason: HOSPADM

## 2021-12-22 RX ORDER — OXYCODONE HYDROCHLORIDE 5 MG/1
5 TABLET ORAL EVERY 4 HOURS PRN
Status: DISCONTINUED | OUTPATIENT
Start: 2021-12-22 | End: 2021-12-22 | Stop reason: HOSPADM

## 2021-12-22 RX ORDER — EPHEDRINE SULFATE 50 MG/ML
INJECTION, SOLUTION INTRAMUSCULAR; INTRAVENOUS; SUBCUTANEOUS PRN
Status: DISCONTINUED | OUTPATIENT
Start: 2021-12-22 | End: 2021-12-22

## 2021-12-22 RX ADMIN — PROPOFOL 50 MCG/KG/MIN: 10 INJECTION, EMULSION INTRAVENOUS at 10:00

## 2021-12-22 RX ADMIN — Medication 5 MG: at 11:05

## 2021-12-22 RX ADMIN — GLYCOPYRROLATE 0.4 MG: 0.2 INJECTION, SOLUTION INTRAMUSCULAR; INTRAVENOUS at 11:56

## 2021-12-22 RX ADMIN — ROCURONIUM BROMIDE 20 MG: 50 INJECTION, SOLUTION INTRAVENOUS at 10:36

## 2021-12-22 RX ADMIN — PROPOFOL 100 MG: 10 INJECTION, EMULSION INTRAVENOUS at 10:02

## 2021-12-22 RX ADMIN — PHENYLEPHRINE HYDROCHLORIDE 100 MCG: 10 INJECTION INTRAVENOUS at 11:28

## 2021-12-22 RX ADMIN — ROCURONIUM BROMIDE 20 MG: 50 INJECTION, SOLUTION INTRAVENOUS at 11:26

## 2021-12-22 RX ADMIN — Medication 2 G: at 10:05

## 2021-12-22 RX ADMIN — PROPOFOL 75 MCG/KG/MIN: 10 INJECTION, EMULSION INTRAVENOUS at 10:02

## 2021-12-22 RX ADMIN — LIDOCAINE HYDROCHLORIDE 30 MG: 20 INJECTION, SOLUTION INFILTRATION; PERINEURAL at 10:02

## 2021-12-22 RX ADMIN — FENTANYL CITRATE 100 MCG: 50 INJECTION, SOLUTION INTRAMUSCULAR; INTRAVENOUS at 10:02

## 2021-12-22 RX ADMIN — PHENAZOPYRIDINE 200 MG: 200 TABLET ORAL at 09:23

## 2021-12-22 RX ADMIN — MIDAZOLAM 2 MG: 1 INJECTION INTRAMUSCULAR; INTRAVENOUS at 09:58

## 2021-12-22 RX ADMIN — PHENYLEPHRINE HYDROCHLORIDE 100 MCG: 10 INJECTION INTRAVENOUS at 11:17

## 2021-12-22 RX ADMIN — GLYCOPYRROLATE 0.2 MG: 0.2 INJECTION, SOLUTION INTRAMUSCULAR; INTRAVENOUS at 10:02

## 2021-12-22 RX ADMIN — SODIUM CHLORIDE, POTASSIUM CHLORIDE, SODIUM LACTATE AND CALCIUM CHLORIDE: 600; 310; 30; 20 INJECTION, SOLUTION INTRAVENOUS at 09:58

## 2021-12-22 RX ADMIN — SODIUM CHLORIDE, POTASSIUM CHLORIDE, SODIUM LACTATE AND CALCIUM CHLORIDE: 600; 310; 30; 20 INJECTION, SOLUTION INTRAVENOUS at 11:34

## 2021-12-22 RX ADMIN — OXYCODONE HYDROCHLORIDE 5 MG: 5 TABLET ORAL at 13:29

## 2021-12-22 RX ADMIN — HYDROMORPHONE HYDROCHLORIDE 1 MG: 1 INJECTION, SOLUTION INTRAMUSCULAR; INTRAVENOUS; SUBCUTANEOUS at 10:02

## 2021-12-22 RX ADMIN — SUGAMMADEX 200 MG: 100 INJECTION, SOLUTION INTRAVENOUS at 12:03

## 2021-12-22 RX ADMIN — DEXAMETHASONE SODIUM PHOSPHATE 4 MG: 4 INJECTION, SOLUTION INTRA-ARTICULAR; INTRALESIONAL; INTRAMUSCULAR; INTRAVENOUS; SOFT TISSUE at 10:04

## 2021-12-22 RX ADMIN — ONDANSETRON HYDROCHLORIDE 4 MG: 2 INJECTION, SOLUTION INTRAVENOUS at 10:02

## 2021-12-22 RX ADMIN — KETOROLAC TROMETHAMINE 30 MG: 30 INJECTION, SOLUTION INTRAMUSCULAR at 10:02

## 2021-12-22 RX ADMIN — ACETAMINOPHEN 975 MG: 325 TABLET, FILM COATED ORAL at 09:23

## 2021-12-22 RX ADMIN — Medication 10 MG: at 10:16

## 2021-12-22 RX ADMIN — NEOSTIGMINE METHYLSULFATE 3 MG: 1 INJECTION, SOLUTION INTRAVENOUS at 11:56

## 2021-12-22 RX ADMIN — FENTANYL CITRATE 50 MCG: 50 INJECTION, SOLUTION INTRAMUSCULAR; INTRAVENOUS at 10:33

## 2021-12-22 RX ADMIN — Medication 10 MG: at 10:22

## 2021-12-22 ASSESSMENT — MIFFLIN-ST. JEOR: SCORE: 1052.47

## 2021-12-22 NOTE — ANESTHESIA CARE TRANSFER NOTE
Patient: Uyen Rodney    Procedure: Procedure(s):  Si Robotic Assisted TOTAL LAPAROSCOPIC HYSTERECTOMY, BILATERAL SALPINGO-OOPHORECTOMY       Diagnosis: HSIL (high grade squamous intraepithelial lesion) on Pap smear of cervix [R87.613]  Fibroid uterus [D25.9]  Ovarian cyst [N83.209]  Diagnosis Additional Information: No value filed.    Anesthesia Type:   General     Note:    Oropharynx: oropharynx clear of all foreign objects  Level of Consciousness: awake  Oxygen Supplementation: face mask    Independent Airway: airway patency satisfactory and stable  Dentition: dentition unchanged  Vital Signs Stable: post-procedure vital signs reviewed and stable  Report to RN Given: handoff report given  Patient transferred to: PACU  Comments: Report and signed off to RN in PACU.  Good Resps, skin pink, VSS, O2 via face tent.  Handoff Report: Identifed the Patient, Identified the Reponsible Provider, Reviewed the pertinent medical history, Discussed the surgical course, Reviewed Intra-OP anesthesia mangement and issues during anesthesia, Set expectations for post-procedure period and Allowed opportunity for questions and acknowledgement of understanding      Vitals:  Vitals Value Taken Time   /68 12/22/21 1210   Temp 36.1  C (96.9  F) 12/22/21 1209   Pulse 72 12/22/21 1211   Resp 8 12/22/21 1211   SpO2 100 % 12/22/21 1211   Vitals shown include unvalidated device data.    Electronically Signed By: PJ Williamson CRNA  December 22, 2021  12:12 PM

## 2021-12-22 NOTE — ANESTHESIA PROCEDURE NOTES
Airway       Patient location during procedure: OR       Procedure Start/Stop Times: 12/22/2021 10:04 AM  Staff -        Anesthesiologist:  Kaya Roque APRN CRNA       Performed By: CRNA  Consent for Airway        Urgency: elective  Indications and Patient Condition       Indications for airway management: dwight-procedural       Induction type:intravenous       Mask difficulty assessment: 1 - vent by mask    Final Airway Details       Final airway type: endotracheal airway       Successful airway: ETT - single and Oral  Endotracheal Airway Details        ETT size (mm): 7.0       Cuffed: yes       Successful intubation technique: direct laryngoscopy       DL Blade Type: [place] 2       Grade View of Cords: 1       Adjucts: stylet       Position: Right       Bite block used: Soft    Post intubation assessment        Placement verified by: capnometry, equal breath sounds and chest rise        Number of attempts at approach: 1       Secured with: plastic tape       Ease of procedure: easy       Dentition: Intact

## 2021-12-22 NOTE — ANESTHESIA POSTPROCEDURE EVALUATION
Patient: Uyen Rodney    Procedure: Procedure(s):  Si Robotic Assisted TOTAL LAPAROSCOPIC HYSTERECTOMY, BILATERAL SALPINGO-OOPHORECTOMY       Diagnosis:HSIL (high grade squamous intraepithelial lesion) on Pap smear of cervix [R87.613]  Fibroid uterus [D25.9]  Ovarian cyst [N83.209]  Diagnosis Additional Information: No value filed.    Anesthesia Type:  General    Note:  Disposition: Outpatient   Postop Pain Control: Uneventful            Sign Out: Well controlled pain   PONV: No   Neuro/Psych: Uneventful            Sign Out: Acceptable/Baseline neuro status   Airway/Respiratory: Uneventful            Sign Out: Acceptable/Baseline resp. status   CV/Hemodynamics: Uneventful            Sign Out: Acceptable CV status; No obvious hypovolemia; No obvious fluid overload   Other NRE: NONE   DID A NON-ROUTINE EVENT OCCUR? No           Last vitals:  Vitals Value Taken Time   /84 12/22/21 1315   Temp 96.9  F (36.1  C) 12/22/21 1209   Pulse 81 12/22/21 1336   Resp 15 12/22/21 1336   SpO2 95 % 12/22/21 1326   Vitals shown include unvalidated device data.    Electronically Signed By: Ronal Schaefer MD  December 22, 2021  1:45 PM

## 2021-12-22 NOTE — OP NOTE
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, attenuated cervix  Fibroid uterus  Ovarian cyst    Post-operative diagnosis:  Same     Procedure:   Robot assisted total laparoscopic hysterectomy  Bilateral salpingo oophorectomy    Surgeon: Karen Sky MD  Assistant: Millie Alicea, EVAN     Anesthesia: General    EBL: 30cc  Urine: 400cc  Fluids: see anesthesia record    Specimens: uterus, cervix, bilateral fallopian tubes and ovaries  Complications: none apparent    Findings: left sided posterior fibroid, mild to moderate adhesions on the left adnexa to the sidewall and posterior cul de sac, cervix with recent post-LEEP changes and a thickened posterior cervix    Indications: Uyen Rodney is a 61 year old female with a history of ARMANDO 3 on colposcopy s/p LEEP in the OR with positive margins on all aspects and a somewhat attenuated cervix, but no sign of cervical cancer.  Due to inability to further resect any area of the already attenuated cervix, she was recommended for definitive procedure with hysterectomy.  She also had a fibroid and ovarian cyst.  Recommended for bilateral salpingo oophorectomy.  Discussed risks, benefits, and alternatives to the procedure including risk of infection, bleeding, damage to local organs, blood clots. With an , the patient's questions were answered, understanding confirmed, and the patient signed written informed consent.    Procedure: The patient was taken to the operating room where she underwent anesthesia and was prepped and draped in the usual sterile fashion in dorsal lithotomy position.  A time-out was performed.      The patient was taken to the operating room where she underwent anesthesia and was prepped and draped in the usual sterile fashion in dorsal lithotomy position.  A time-out was performed.  A mclaughlin was inserted.  A  speculum was inserted into the vagina and the medium I-Pulse-care uterine manipulator was inserted. The speculum was removed.     Attention was then turned to the abdomen where penetrating towel clips were applied to the lateral sides of the umbilicus.  Local anesthetic as infiltrated, and an 11-blade scalpel was used to make an incision at the base of the umbilicus. The abdominal skin was tented and a Veress needle inserted into the abdomen. Syringe withdrawal and saline drip were normal, and opening pressure was < 5 mmHg.  Insufflation was then turned to high flow and once the intra-abdominal pressure reached 15 mmHg, the needle was removed.     The 8mm port was placed though the umbilicus while the skin was tented manually. Visualization with the laparoscope showed adequate entry into the peritoneum without injury. Pneumoperitoneum was achieved with good tympany of the abdomen.  The first robot port was marked in the RLQ, local anesthetic injected, a 5 mm horizontal skin incision made, and an 8mm port was placed under direct visualization within the abdomen. The second robot port was placed in similar fashion in the LLQ. An assistant port was placed in a similar fashion 8 cm lateral and slightly superior to the right port. The robot was then docked to the patient's left side and all instruments and the camera placed into the abdomen through the ports, using the fenestrated bipolar on the left port and the monopolar scissors on the right.    The right ureter was noted to be running through the peritoneum.  The right IP ligament was transected with bipolar then monopolar scissors cautery, the dissection was extended down the right mesosalpinx to the round ligament.  This was repeated on the left side after take down of some adhesions on the left pelvic sidewall and fallopian tube.   The left round ligament was isolated and cauterized, and then transected. Then the vesicouterine peritoneum was incised and the bladder flap  was developed at the upper vagina.  The right round ligament was then taken down in a similar manner.  The bilateral uterine arteries were isolated, cauterized with bipolar cautery, and transected.  The incision was then extended along the cardinal and uterosacral ligaments, keeping close to the cervix to the level of the upper vagina.  The V-Care cup was then palpated at the level of the upper vagina.  The upper vagina was incised in a circumferential manner with monopolar cautery, resecting the uterus and cervix.  The specimen was brought out with traction on the V-Care through the vaginal opening and was sent to pathology.  Notably, there was an area of thickened tissue on the posterior aspect of the cervix which was also excised with the specimen.        The vaginal balloon was again reinserted to obtain adequate insufflation.  The vaginal cuff was closed with a V-lock suture, enveloping the posterior peritoneum.  The pelvis was then irrigated and after cautery of areas that were bleeding, excellent hemostasis was observed.  Ron was placed over the pedicles and cuff.      The skin incisions were closed with 4-0 monocryl, and reinforced with dermabond.  The vaginal balloon was removed, the mclaughlin was removed, and the vaginal cuff observed from below was noted to be intact with but there was a small amount of bleeding at the posterior cuff at the site of the previously thickened tissue.  The vaginal cuff was reinforced from below using 0-vicryl on a CT needle, running for several sutures.  Excellent hemostasis and closure was observed from below.  The mclaughlin was removed.     Instrument, needle, and sponge counts were correct times 2.  The patient was extubated in the OR and transferred to the PACU in stable condition.     The assistance of the co-surgeon was required due to the need for additional skilled surgical hands to retract and hold instruments due to the nature of the surgical procedure and risk of  complications.    Karen Sky MD, MPH  [facility]

## 2021-12-22 NOTE — DISCHARGE INSTRUCTIONS
LAPAROSCOPIC HYSTERECTOMY DISCHARGE INSTRUCTIONS    DR. MALCOLM HARRIS M.D.          CLINIC PHONE NUMBER:  [phone]  [facility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ou had Pyridium this morning. This may cause your urine to turn orange temporarily.      Maximum acetaminophen (Tylenol) dose from all sources should not exceed 4 grams (4000 mg) per day.  You had 975mg of tylenol at 9:25am.  Do not take tylenol products until after 3:25pm.    You received Toradol, an IV form of ibuprofen (Motrin) at 10:00am.  Do not take any ibuprofen products until after 4:00pm.

## 2021-12-22 NOTE — BRIEF OP NOTE
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, attenuated cervix  Fibroid uterus  Ovarian cyst    Post-operative diagnosis:  Same     Procedure:   Robot assisted total laparoscopic hysterectomy  Bilateral salpingo oophorectomy    Surgeon: Karen Sky MD  Assistant: Millie Alicea, CST     Anesthesia: General    EBL: 30cc  Urine: 400cc  Fluids: see anesthesia record    Specimens: uterus, cervix, bilateral fallopian tubes and ovaries  Complications: none apparent    Findings: left sided posterior fibroid, mild to moderate adhesions on the left adnexa to the sidewall and posterior cul de sac, cervix with recent post-LEEP changes and a thickened posterior cervix

## 2021-12-22 NOTE — ANESTHESIA PREPROCEDURE EVALUATION
Anesthesia Pre-Procedure Evaluation    Patient: Uyen Rodney   MRN: [id number] : 1960        Preoperative Diagnosis: HSIL (high grade squamous intraepithelial lesion) on Pap smear of cervix [R87.613]  Fibroid uterus [D25.9]  Ovarian cyst [N83.209]    Procedure : Procedure(s):  Si Robotic Assisted TOTAL LAPAROSCOPIC HYSTERECTOMY, BILATERAL SALPINGO-OOPHORECTOMY          Past Medical History:   Diagnosis Date     Abnormal Pap smear of cervix 2021     Cervical high risk HPV (human papillomavirus) test positive 2021     Hypertension      Thyroid disease       Past Surgical History:   Procedure Laterality Date     THYROIDECTOMY         No Known Allergies   Social History     Tobacco Use     Smoking status: Never Smoker     Smokeless tobacco: Never Used   Substance Use Topics     Alcohol use: Never      Wt Readings from Last 1 Encounters:   21 57.4 kg (126 lb 9.6 oz)        Anesthesia Evaluation   Pt has had prior anesthetic. Type: General.    No history of anesthetic complications       ROS/MED HX  ENT/Pulmonary:  - neg pulmonary ROS     Neurologic:  - neg neurologic ROS     Cardiovascular:     (+) Dyslipidemia hypertension-----    METS/Exercise Tolerance:     Hematologic:  - neg hematologic  ROS     Musculoskeletal:  - neg musculoskeletal ROS     GI/Hepatic:  - neg GI/hepatic ROS     Renal/Genitourinary:  - neg Renal ROS     Endo:     (+) thyroid problem,  Thyroid disease - Other nodular disease,     Psychiatric/Substance Use:  - neg psychiatric ROS     Infectious Disease:  - neg infectious disease ROS     Malignancy:  - neg malignancy ROS     Other:  - neg other ROS          Physical Exam    Airway        Mallampati: I   TM distance: > 3 FB   Neck ROM: full   Mouth opening: > 3 cm    Respiratory Devices and Support         Dental  no notable dental history         Cardiovascular   cardiovascular exam normal       Rhythm and rate: regular and normal     Pulmonary   pulmonary exam normal         breath sounds clear to auscultation       Other findings: Lab Test        11/08/21 09/17/21                       0810          0948          WBC           --          6.2           HGB          11.8         12.2          MCV           --          87            PLT           --          264            Lab Test        11/08/21 09/17/21                       0810          0948          NA           138          141           POTASSIUM    4.2          4.1           CHLORIDE     107          108           CO2          26           28            BUN          13           15            CR           0.70         0.67          ANIONGAP     5            5             RUY          9.2          9.7           GLC          101*         96                    ECG  Sinus  Rhythm   -Left atrial enlargement.    -  Negative precordial T-waves.     BORDERLINE    OUTSIDE LABS:  CBC:   Lab Results   Component Value Date    WBC 6.2 09/17/2021    HGB 11.8 11/08/2021    HGB 12.2 09/17/2021    HCT 36.4 09/17/2021     09/17/2021     BMP:   Lab Results   Component Value Date     11/08/2021     09/17/2021    POTASSIUM 4.2 11/08/2021    POTASSIUM 4.1 09/17/2021    CHLORIDE 107 11/08/2021    CHLORIDE 108 09/17/2021    CO2 26 11/08/2021    CO2 28 09/17/2021    BUN 13 11/08/2021    BUN 15 09/17/2021    CR 0.70 11/08/2021    CR 0.67 09/17/2021     (H) 11/08/2021    GLC 96 09/17/2021     COAGS: No results found for: PTT, INR, FIBR  POC: No results found for: BGM, HCG, HCGS  HEPATIC:   Lab Results   Component Value Date    ALBUMIN 3.9 09/17/2021    PROTTOTAL 8.8 09/17/2021    ALT 27 09/17/2021    AST 19 09/17/2021    ALKPHOS 89 09/17/2021    BILITOTAL 0.4 09/17/2021     OTHER:   Lab Results   Component Value Date    RUY 9.2 11/08/2021    TSH 2.32 09/17/2021       Anesthesia Plan    ASA Status:  2      Anesthesia Type: General.     - Airway: ETT   Induction: Intravenous.   Maintenance: Balanced.         Consents    Anesthesia Plan(s) and associated risks, benefits, and realistic alternatives discussed. Questions answered and patient/representative(s) expressed understanding.     - Discussed: Risks, Benefits and Alternatives for BOTH SEDATION and the PROCEDURE were discussed     - Discussed with:  Patient      - Extended Intubation/Ventilatory Support Discussed: No.      - Patient is DNR/DNI Status: No    Use of blood products discussed: No .     Postoperative Care    Pain management: IV analgesics, Oral pain medications, Multi-modal analgesia.   PONV prophylaxis: Ondansetron (or other 5HT-3), Dexamethasone or Solumedrol, Background Propofol Infusion     Comments:                Ronal Schaefer MD

## 2021-12-23 ENCOUNTER — TELEPHONE (OUTPATIENT)
Dept: OBGYN | Facility: CLINIC | Age: 61
End: 2021-12-23

## 2021-12-23 LAB
PATH REPORT.COMMENTS IMP SPEC: NORMAL
PATH REPORT.COMMENTS IMP SPEC: NORMAL
PATH REPORT.FINAL DX SPEC: NORMAL
PATH REPORT.GROSS SPEC: NORMAL
PATH REPORT.MICROSCOPIC SPEC OTHER STN: NORMAL
PATH REPORT.RELEVANT HX SPEC: NORMAL
PHOTO IMAGE: NORMAL

## 2021-12-23 PROCEDURE — 88309 TISSUE EXAM BY PATHOLOGIST: CPT | Mod: 26 | Performed by: PATHOLOGY

## 2021-12-23 NOTE — LETTER
December 23, 2021      Uyen Rodney  [address] [address] [facility] [address] [facility] [address]        To Whom It May Concern,     Uyen Rodney is a patient under my care.  Due to a medical procedure she is advised to be off of work for 6 weeks.  She will be able to return to work on 2/2/2022.      Sincerely,        Karen Sky MD

## 2021-12-23 NOTE — TELEPHONE ENCOUNTER
Pt's daughter calling, needing a letter for her mother's work stating how long she needs to be off after surgery.    I advised we could have a letter printed, but it would not be signed by Dr Sky as she is out until 12/29.    She will use the AVS with restrictions for now and plan to  the letter when Dr Sky is back.    I have drafted the letter for Dr Sky to review and sign when she is able.    Please advise pt once letter is ready to .    Cristin Fernando RN

## 2022-01-03 NOTE — TELEPHONE ENCOUNTER
I didn't see this letter anywhere, did I miss it?  Or did someone else send already?    Thanks  Karen Sky MD, MPH  [facility]

## 2022-01-04 ENCOUNTER — PATIENT OUTREACH (OUTPATIENT)
Dept: OBGYN | Facility: CLINIC | Age: 62
End: 2022-01-04
Payer: COMMERCIAL

## 2022-01-04 DIAGNOSIS — E78.5 HYPERLIPIDEMIA LDL GOAL <130: ICD-10-CM

## 2022-01-04 DIAGNOSIS — R87.613 HSIL (HIGH GRADE SQUAMOUS INTRAEPITHELIAL LESION) ON PAP SMEAR OF CERVIX: ICD-10-CM

## 2022-01-04 NOTE — TELEPHONE ENCOUNTER
12/22/21 Hysterectomy pathology = ARMANDO 2/3, endocervical gland involvement. No cancer. Plan paps until age 86.

## 2022-01-05 RX ORDER — SIMVASTATIN 10 MG
10 TABLET ORAL AT BEDTIME
Qty: 90 TABLET | Refills: 2 | Status: SHIPPED | OUTPATIENT
Start: 2022-01-05 | End: 2022-09-20

## 2022-01-05 NOTE — TELEPHONE ENCOUNTER
Prescription approved per [facility] Refill Protocol.  Caitlin Fall RN    LDL Cholesterol Calculated   Date Value Ref Range Status   09/17/2021 141 (H) <=100 mg/dL Final

## 2022-01-05 NOTE — TELEPHONE ENCOUNTER
It's in there! Under letters and one dated 12/23/21.  I am not in today to print it off, maybe someone else is able to look for it.  I will also try to route it to you.    Cristin Fernando RN

## 2022-01-12 ENCOUNTER — TELEPHONE (OUTPATIENT)
Dept: OBGYN | Facility: CLINIC | Age: 62
End: 2022-01-12
Payer: COMMERCIAL

## 2022-01-12 NOTE — TELEPHONE ENCOUNTER
Spoke to Uyen via  and informed her that her work release letter will be mailed to her, she agreed with plan.  Gaby Walton, CMA

## 2022-01-12 NOTE — TELEPHONE ENCOUNTER
I found it.  Signed and given to Gaby today.    Karen Sky MD, MPH  [facility]

## 2022-02-14 ENCOUNTER — OFFICE VISIT (OUTPATIENT)
Dept: OBGYN | Facility: CLINIC | Age: 62
End: 2022-02-14
Payer: COMMERCIAL

## 2022-02-14 VITALS
SYSTOLIC BLOOD PRESSURE: 148 MMHG | WEIGHT: 125 LBS | BODY MASS INDEX: 24.54 KG/M2 | HEIGHT: 60 IN | DIASTOLIC BLOOD PRESSURE: 78 MMHG | HEART RATE: 88 BPM

## 2022-02-14 DIAGNOSIS — Z90.710 S/P HYSTERECTOMY: Primary | ICD-10-CM

## 2022-02-14 PROCEDURE — 99024 POSTOP FOLLOW-UP VISIT: CPT | Performed by: OBSTETRICS & GYNECOLOGY

## 2022-02-14 ASSESSMENT — MIFFLIN-ST. JEOR: SCORE: 1052.47

## 2022-02-14 NOTE — PROGRESS NOTES
"  SUBJECTIVE:                                                   CC:  Patient presents with:  Surgical Followup      HPI:  Uyen Rodney is a 61 year old female s/p TLH BSO for ARMANDO 2-3 on 12/22 who presents for a post op visit.      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  No bleeding.  No sex yet.  Feels like she was cured.  Here today with  and using ipad  for Pashto language.      OBJECTIVE:     BP (!) 148/78   Pulse 88   Ht 1.53 m (5' 0.25\")   Wt 56.7 kg (125 lb)   Breastfeeding No   BMI 24.21 kg/m      Gen: Healthy appearing thin female, no acute distress, comfortable  Incisions: clean/dry/intact, no s/s of infection  SSE:  Cuff intact, no sutures visible    Test Results:  Pathology:  A. Cervix, uterus, bilateral fallopian tubes and ovaries, total laparoscopic hysterectomy and bilateral salpingo-oophorectomy (62.9 grams):  Cervix:                -High-grade squamous intraepithelial lesion (cervical intraepithelial neoplasia ARMANDO 2-3) with extensive involvement of endocervical glands.                -High-grade dysplasia involves all four quadrants.                -Prior procedure site changes.   Endometrium:  -Inactive endometrium.  Myometrium:  -Leiomyoma, 2.3 cm in greatest size.  Bilateral ovaries:  -Serous cysts, epithelial inclusion cysts and endosalpingiosis.  Bilateral fallopian tubes:                " -Unremarkable    ASSESSMENT/PLAN:                                                      1. S/P hysterectomy  May resume intercourse and be off pelvic rest.  Discussed pathology results with the patient.     2. HSIL pap smear s/p hysterectomy 12/2021 9/17/21 HSIL, +HPV 16 & other HR type. Plan [place] bef 12/17/21 9/24/21 Left message with help from interpretor to call back  9/28/21 Visit with PCP: Pap/HPV results discussed with patient / result letter sent with [place] instructions  10/19/21 [place]: Cervical Bx's x 4 = ARMANDO 3. ECC = ARMANDO 3.  Plan LEEP  12/22/21 Hysterectomy pathology = ARMANDO 2/3, endocervical gland involvement. No cancer. Plan paps until age 86. Co-test due 12/22/22    Karen Sky MD, MPH  Obstetrics and Gynecology

## 2022-02-14 NOTE — NURSING NOTE
"Chief Complaint   Patient presents with     Surgical Followup       Initial BP (!) 148/78   Pulse 88   Ht 1.53 m (5' 0.25\")   Wt 56.7 kg (125 lb)   Breastfeeding No   BMI 24.21 kg/m   Estimated body mass index is 24.21 kg/m  as calculated from the following:    Height as of this encounter: 1.53 m (5' 0.25\").    Weight as of this encounter: 56.7 kg (125 lb).  BP completed using cuff size: regular    Questioned patient about current smoking habits.  Pt. has never smoked.      No obstetric history on file.    The following HM Due: NONE      The following patient reported/Care Every where data was sent to:  P ABSTRACT QUALITY INITIATIVES [[id number]]  Samantha Jacobo LPN               "

## 2022-03-28 ENCOUNTER — OFFICE VISIT (OUTPATIENT)
Dept: FAMILY MEDICINE | Facility: CLINIC | Age: 62
End: 2022-03-28
Payer: COMMERCIAL

## 2022-03-28 VITALS
SYSTOLIC BLOOD PRESSURE: 152 MMHG | WEIGHT: 121.2 LBS | TEMPERATURE: 97.9 F | HEART RATE: 73 BPM | DIASTOLIC BLOOD PRESSURE: 82 MMHG | BODY MASS INDEX: 23.8 KG/M2 | OXYGEN SATURATION: 98 % | HEIGHT: 60 IN

## 2022-03-28 DIAGNOSIS — I10 BENIGN ESSENTIAL HYPERTENSION: Primary | ICD-10-CM

## 2022-03-28 DIAGNOSIS — E78.5 HYPERLIPIDEMIA LDL GOAL <130: ICD-10-CM

## 2022-03-28 LAB
ALBUMIN SERPL-MCNC: 3.6 G/DL (ref 3.4–5)
ALP SERPL-CCNC: 93 U/L (ref 40–150)
ALT SERPL W P-5'-P-CCNC: 24 U/L (ref 0–50)
ANION GAP SERPL CALCULATED.3IONS-SCNC: 4 MMOL/L (ref 3–14)
AST SERPL W P-5'-P-CCNC: 24 U/L (ref 0–45)
BILIRUB SERPL-MCNC: 0.7 MG/DL (ref 0.2–1.3)
BUN SERPL-MCNC: 13 MG/DL (ref 7–30)
CALCIUM SERPL-MCNC: 9.4 MG/DL (ref 8.5–10.1)
CHLORIDE BLD-SCNC: 107 MMOL/L (ref 94–109)
CHOLEST SERPL-MCNC: 129 MG/DL
CO2 SERPL-SCNC: 28 MMOL/L (ref 20–32)
CREAT SERPL-MCNC: 0.64 MG/DL (ref 0.52–1.04)
FASTING STATUS PATIENT QL REPORTED: YES
GFR SERPL CREATININE-BSD FRML MDRD: >90 ML/MIN/1.73M2
GLUCOSE BLD-MCNC: 96 MG/DL (ref 70–99)
HDLC SERPL-MCNC: 43 MG/DL
LDLC SERPL CALC-MCNC: 67 MG/DL
NONHDLC SERPL-MCNC: 86 MG/DL
POTASSIUM BLD-SCNC: 3.4 MMOL/L (ref 3.4–5.3)
PROT SERPL-MCNC: 8.5 G/DL (ref 6.8–8.8)
SODIUM SERPL-SCNC: 139 MMOL/L (ref 133–144)
TRIGL SERPL-MCNC: 95 MG/DL

## 2022-03-28 PROCEDURE — 36415 COLL VENOUS BLD VENIPUNCTURE: CPT | Performed by: PHYSICIAN ASSISTANT

## 2022-03-28 PROCEDURE — 80053 COMPREHEN METABOLIC PANEL: CPT | Performed by: PHYSICIAN ASSISTANT

## 2022-03-28 PROCEDURE — 99214 OFFICE O/P EST MOD 30 MIN: CPT | Performed by: PHYSICIAN ASSISTANT

## 2022-03-28 PROCEDURE — 80061 LIPID PANEL: CPT | Performed by: PHYSICIAN ASSISTANT

## 2022-03-28 RX ORDER — LISINOPRIL 20 MG/1
20 TABLET ORAL DAILY
Qty: 90 TABLET | Refills: 0 | Status: SHIPPED | OUTPATIENT
Start: 2022-03-28 | End: 2022-06-22

## 2022-03-28 NOTE — PROGRESS NOTES
Future Appointments   Date Time Provider Department Center   3/28/2022 10:30 AM Aletha Kowalski PA-C [facility]     Appointment Notes for this encounter:      Med check ok per   For  over the phone please call;[phone] option 6 or use iPad    Health Maintenance Due   Topic Date Due     ADVANCE CARE PLANNING  Never done     MAMMO SCREENING  Never done     COLORECTAL CANCER SCREENING  Never done     ZOSTER IMMUNIZATION (1 of 2) Never done     INFLUENZA VACCINE (1) 09/01/2021     COVID-19 Vaccine (3 - Booster for Pfizer series) 11/28/2021     PHQ-2 (once per calendar year)  Never done     Health Maintenance addressed:    Mammogram Pt declined, Colonoscopy Pt declined and Immunizations Pt declined    MyChart Status:  Not Active Signed pt up for MyChart    Assessment & Plan     Benign essential hypertension  Patient is still taking captopril though it was stopped in September by PCP.  She is not currently taking the lisinopril even though this was ordered in January for her.  Notes from September reviewed and discussed with pharmacist.   Will restart lisinopril. Instructed patient to stop captopril.  Labs obtained today as noted below.  Follow up in 1 month for repeat BP check. At that time patient will be on the lisinopril for 1 month. If not well controlled can adjust medication dosing.  - Comprehensive metabolic panel (BMP + Alb, Alk Phos, ALT, AST, Total. Bili, TP); Future  - Comprehensive metabolic panel (BMP + Alb, Alk Phos, ALT, AST, Total. Bili, TP)  - lisinopril (ZESTRIL) 20 MG tablet; Take 1 tablet (20 mg) by mouth daily    Hyperlipidemia LDL goal <130  Refill not needed for simvastatin. Obtained labs.  - Lipid panel reflex to direct LDL Fasting; Future  - Lipid panel reflex to direct LDL Fasting    Discussion of management or test interpretation with external physician/other qualified healthcare professional/appropriate source -  Snapd App [facility] pharmacy  Ordering of each unique  "test  Prescription drug management    Return in about 1 month (around 4/28/2022) for Follow up BP, nurse only.    Aletha Kowalski PA-C  [facility]    Patient and son requesting letter with lab results in English    Subjective   Thi is a 61 year old who presents with son for the following health issues    through iPad used for entire encounter.    HPI     Hyperlipidemia Follow-Up      Are you regularly taking any medication or supplement to lower your cholesterol?   Yes    Are you having muscle aches or other side effects that you think could be caused by your cholesterol lowering medication?  No    Hypertension Follow-up      Do you check your blood pressure regularly outside of the clinic? No     Are you following a low salt diet? Yes    Are your blood pressures ever more than 140 on the top number (systolic) OR more   than 90 on the bottom number (diastolic), for example 140/90? Yes    Patient notes she is feeling well. No concerns or side effects.    BP Readings from Last 2 Encounters:   03/28/22 (!) 152/84   02/14/22 (!) 148/78     LDL Cholesterol Calculated (mg/dL)   Date Value   09/17/2021 141 (H)       Review of Systems   GENERAL:  No fevers        Objective    BP (!) 152/82   Pulse 73   Temp 97.9  F (36.6  C) (Oral)   Ht 1.53 m (5' 0.25\")   Wt 55 kg (121 lb 3.2 oz)   SpO2 98%   BMI 23.47 kg/m    Body mass index is 23.47 kg/m .  Physical Exam   GENERAL: No acute distress  HEENT: Normocephalic  NEURO: Alert and non-focal          "

## 2022-03-28 NOTE — LETTER
March 29, 2022      Uyen Rodney  [address] [facility] [address]        Dear ,    We are writing to inform you of your test results.    Cholesterol looks great. Electrolytes, kidney function and liver enzymes are normal.       Resulted Orders   Lipid panel reflex to direct LDL Fasting   Result Value Ref Range    Cholesterol 129 <200 mg/dL    Triglycerides 95 <150 mg/dL    Direct Measure HDL 43 (L) >=50 mg/dL    LDL Cholesterol Calculated 67 <=100 mg/dL    Non HDL Cholesterol 86 <130 mg/dL    Patient Fasting > 8hrs? Yes     Narrative    Cholesterol  Desirable:  <200 mg/dL    Triglycerides  Normal:  Less than 150 mg/dL  Borderline High:  150-199 mg/dL  High:  200-499 mg/dL  Very High:  Greater than or equal to 500 mg/dL    Direct Measure HDL  Female:  Greater than or equal to 50 mg/dL   Male:  Greater than or equal to 40 mg/dL    LDL Cholesterol  Desirable:  <100mg/dL  Above Desirable:  100-129 mg/dL   Borderline High:  130-159 mg/dL   High:  160-189 mg/dL   Very High:  >= 190 mg/dL    Non HDL Cholesterol  Desirable:  130 mg/dL  Above Desirable:  130-159 mg/dL  Borderline High:  160-189 mg/dL  High:  190-219 mg/dL  Very High:  Greater than or equal to 220 mg/dL   Comprehensive metabolic panel (BMP + Alb, Alk Phos, ALT, AST, Total. Bili, TP)   Result Value Ref Range    Sodium 139 133 - 144 mmol/L    Potassium 3.4 3.4 - 5.3 mmol/L    Chloride 107 94 - 109 mmol/L    Carbon Dioxide (CO2) 28 20 - 32 mmol/L    Anion Gap 4 3 - 14 mmol/L    Urea Nitrogen 13 7 - 30 mg/dL    Creatinine 0.64 0.52 - 1.04 mg/dL    Calcium 9.4 8.5 - 10.1 mg/dL    Glucose 96 70 - 99 mg/dL    Alkaline Phosphatase 93 40 - 150 U/L    AST 24 0 - 45 U/L    ALT 24 0 - 50 U/L    Protein Total 8.5 6.8 - 8.8 g/dL    Albumin 3.6 3.4 - 5.0 g/dL    Bilirubin Total 0.7 0.2 - 1.3 mg/dL    GFR Estimate >90 >60 mL/min/1.73m2      Comment:      Effective December 21, 2021 eGFRcr in adults is calculated using the 2021 CKD-EPI creatinine  equation which includes age and gender (Jillian et al., NEJM, DOI: 10.1056/TQKYvr0478943)       If you have any questions or concerns, please call the clinic at the number listed above.       Sincerely,      Aletha Kowalski PA-C

## 2022-03-28 NOTE — LETTER
March 29, 2022      Uyen Rodney  [address] [address]        Dear ,    We are writing to inform you of your test results.    Cholesterol looks great. Electrolytes, kidney function and liver enzymes are normal.       Resulted Orders   Lipid panel reflex to direct LDL Fasting   Result Value Ref Range    Cholesterol 129 <200 mg/dL    Triglycerides 95 <150 mg/dL    Direct Measure HDL 43 (L) >=50 mg/dL    LDL Cholesterol Calculated 67 <=100 mg/dL    Non HDL Cholesterol 86 <130 mg/dL    Patient Fasting > 8hrs? Yes     Narrative    Cholesterol  Desirable:  <200 mg/dL    Triglycerides  Normal:  Less than 150 mg/dL  Borderline High:  150-199 mg/dL  High:  200-499 mg/dL  Very High:  Greater than or equal to 500 mg/dL    Direct Measure HDL  Female:  Greater than or equal to 50 mg/dL   Male:  Greater than or equal to 40 mg/dL    LDL Cholesterol  Desirable:  <100mg/dL  Above Desirable:  100-129 mg/dL   Borderline High:  130-159 mg/dL   High:  160-189 mg/dL   Very High:  >= 190 mg/dL    Non HDL Cholesterol  Desirable:  130 mg/dL  Above Desirable:  130-159 mg/dL  Borderline High:  160-189 mg/dL  High:  190-219 mg/dL  Very High:  Greater than or equal to 220 mg/dL   Comprehensive metabolic panel (BMP + Alb, Alk Phos, ALT, AST, Total. Bili, TP)   Result Value Ref Range    Sodium 139 133 - 144 mmol/L    Potassium 3.4 3.4 - 5.3 mmol/L    Chloride 107 94 - 109 mmol/L    Carbon Dioxide (CO2) 28 20 - 32 mmol/L    Anion Gap 4 3 - 14 mmol/L    Urea Nitrogen 13 7 - 30 mg/dL    Creatinine 0.64 0.52 - 1.04 mg/dL    Calcium 9.4 8.5 - 10.1 mg/dL    Glucose 96 70 - 99 mg/dL    Alkaline Phosphatase 93 40 - 150 U/L    AST 24 0 - 45 U/L    ALT 24 0 - 50 U/L    Protein Total 8.5 6.8 - 8.8 g/dL    Albumin 3.6 3.4 - 5.0 g/dL    Bilirubin Total 0.7 0.2 - 1.3 mg/dL    GFR Estimate >90 >60 mL/min/1.73m2      Comment:      Effective December 21, 2021 eGFRcr in adults is calculated using the 2021 CKD-EPI creatinine  equation which includes age and gender (Jillian et al., NEJM, DOI: 10.1056/ANWAnl2535070)       If you have any questions or concerns, please call the clinic at the number listed above.       Sincerely,      Aletha Kowalski PA-C

## 2022-05-02 ENCOUNTER — ALLIED HEALTH/NURSE VISIT (OUTPATIENT)
Dept: FAMILY MEDICINE | Facility: CLINIC | Age: 62
End: 2022-05-02
Payer: COMMERCIAL

## 2022-05-02 VITALS — DIASTOLIC BLOOD PRESSURE: 79 MMHG | SYSTOLIC BLOOD PRESSURE: 131 MMHG | HEART RATE: 71 BPM

## 2022-05-02 DIAGNOSIS — I10 BENIGN ESSENTIAL HYPERTENSION: Primary | ICD-10-CM

## 2022-05-02 PROCEDURE — 99207 PR NO CHARGE NURSE ONLY: CPT

## 2022-05-02 NOTE — PROGRESS NOTES
Uyen Rodney is a 61 year old patient who comes in today for a Blood Pressure check.  Initial BP:  /79 (BP Location: Left arm, Patient Position: Sitting, Cuff Size: Adult Regular)   Pulse 71      Data Unavailable  Disposition: follow-up as previously indicated by provider  Alejandra Jesus CMA

## 2022-06-20 DIAGNOSIS — I10 BENIGN ESSENTIAL HYPERTENSION: ICD-10-CM

## 2022-06-22 RX ORDER — LISINOPRIL 20 MG/1
20 TABLET ORAL DAILY
Qty: 90 TABLET | Refills: 1 | Status: SHIPPED | OUTPATIENT
Start: 2022-06-22 | End: 2022-10-10

## 2022-09-19 DIAGNOSIS — E78.5 HYPERLIPIDEMIA LDL GOAL <130: ICD-10-CM

## 2022-09-20 RX ORDER — SIMVASTATIN 10 MG
10 TABLET ORAL AT BEDTIME
Qty: 90 TABLET | Refills: 2 | Status: SHIPPED | OUTPATIENT
Start: 2022-09-20 | End: 2023-04-18

## 2022-09-20 NOTE — TELEPHONE ENCOUNTER
Prescription approved per [facility] Refill Protocol.    Dana DALE RN, BSN, PHN  [facility]

## 2022-10-10 ENCOUNTER — OFFICE VISIT (OUTPATIENT)
Dept: FAMILY MEDICINE | Facility: CLINIC | Age: 62
End: 2022-10-10
Payer: COMMERCIAL

## 2022-10-10 VITALS
TEMPERATURE: 98.6 F | BODY MASS INDEX: 24.7 KG/M2 | RESPIRATION RATE: 12 BRPM | SYSTOLIC BLOOD PRESSURE: 130 MMHG | OXYGEN SATURATION: 98 % | HEART RATE: 72 BPM | WEIGHT: 125.8 LBS | DIASTOLIC BLOOD PRESSURE: 70 MMHG | HEIGHT: 60 IN

## 2022-10-10 DIAGNOSIS — Z90.710 S/P HYSTERECTOMY: ICD-10-CM

## 2022-10-10 DIAGNOSIS — I10 BENIGN ESSENTIAL HYPERTENSION: Primary | ICD-10-CM

## 2022-10-10 DIAGNOSIS — Z12.11 SCREEN FOR COLON CANCER: ICD-10-CM

## 2022-10-10 DIAGNOSIS — E78.5 HYPERLIPIDEMIA LDL GOAL <130: ICD-10-CM

## 2022-10-10 DIAGNOSIS — Z12.31 VISIT FOR SCREENING MAMMOGRAM: ICD-10-CM

## 2022-10-10 PROCEDURE — 99214 OFFICE O/P EST MOD 30 MIN: CPT | Performed by: PHYSICIAN ASSISTANT

## 2022-10-10 RX ORDER — LISINOPRIL 20 MG/1
20 TABLET ORAL DAILY
Qty: 90 TABLET | Refills: 1 | Status: SHIPPED | OUTPATIENT
Start: 2022-10-10 | End: 2023-04-18

## 2022-10-10 NOTE — PROGRESS NOTES
Assessment & Plan     Benign essential hypertension  Blood pressure shows good control. Continue on lisinopril 20 mg daily. Denies any side effects.  Patient requesting at home blood pressure cuff. Order placed for patient today.  Follow up in 6 months for establish care and recheck of chronic medical problems.  - Home Blood Pressure Monitor Order for DME - ONLY FOR DME  - lisinopril (ZESTRIL) 20 MG tablet; Take 1 tablet (20 mg) by mouth daily    Hyperlipidemia LDL goal <130  Due for labs in March/April. Will hold off on labs right now.    S/P hysterectomy  Hysterectomy performed due to precancerous cells on LEEP. Per OB/GYN records pap smear due this December and patient will need them until age 86.  I recommended she do her annual in December with OB/GYN. They can perform the annual and do the pap smear needed.  - Ob/Gyn Referral; Future    Visit for screening mammogram  Due for screening mammogram. Ordered for patient.  - MA SCREENING DIGITAL BILAT - Future  (s+30); Future    Screen for colon cancer  Due for colon cancer screening. Ordered for patient.  - Colonoscopy Screening  Referral; Future    Review of external notes as documented elsewhere in note  Prescription drug management  35 minutes spent on the date of the encounter doing chart review, history and exam, documentation and further activities per the note         No follow-ups on file.   Follow-up Visit   Expected date:  Apr 10, 2023 (Approximate)      Follow Up Appointment Details:     Follow-up with whom?: PCP    Follow-Up for what?: Chronic Disease f/u    Chronic Disease f/u:  Hyperlipidemia  Hypertension       How?: In Person                    Aletha Kowalski PA-C  [facility]   Uyen is a 62 year old, presenting with daughter and using iPad  for the following health issues:  Lipids and Hypertension      HPI     Hyperlipidemia Follow-Up      Are you regularly taking any medication or  "supplement to lower your cholesterol?   Yes- Simvastatin-10 mg tab     Are you having muscle aches or other side effects that you think could be caused by your cholesterol lowering medication?  No    Hypertension Follow-up      Do you check your blood pressure regularly outside of the clinic? No     Are you following a low salt diet? Yes    Are your blood pressures ever more than 140 on the top number (systolic) OR more   than 90 on the bottom number (diastolic), for example 140/90? No    Denies any side effects.    Review of Systems   GENERAL:  No fevers  RESP:  No shortness of breath  CARDIAC: No chest pain        Objective    /70 (BP Location: Right arm, Patient Position: Sitting, Cuff Size: Adult Regular)   Pulse 72   Temp 98.6  F (37  C) (Oral)   Resp 12   Ht 1.53 m (5' 0.25\")   Wt 57.1 kg (125 lb 12.8 oz)   SpO2 98%   BMI 24.37 kg/m    Body mass index is 24.37 kg/m .  Physical Exam   GENERAL: No acute distress  HEENT: Normocephalic  CARDIAC: Regular rate and rhythm. No murmurs.  PULMONARY: Lungs are clear to auscultation bilaterally. No wheezes, rhonchi or crackles.  MSK: No pedal edema bilaterally.  NEURO: Alert and non-focal                "

## 2022-10-21 ENCOUNTER — TELEPHONE (OUTPATIENT)
Dept: GASTROENTEROLOGY | Facility: CLINIC | Age: 62
End: 2022-10-21

## 2022-10-21 NOTE — TELEPHONE ENCOUNTER
Daughter Phi called to schedule colonoscopy,  offered to call back with an .  When call back attempt was made there was no answer, a vm was left via  including the number for [facility] [employer]  line & endo scheduling line.

## 2022-10-21 NOTE — TELEPHONE ENCOUNTER
Screening Questions  BLUE  KIND OF PREP RED  LOCATION [review exclusion criteria] GREEN  SEDATION TYPE        Y Are you active on mychart?       Aletha Kowalski PA-C   Ordering/Referring Provider?        [employer] What type of coverage do you have?      N Have you had a positive covid test in the last 90 days?     25.5 1. BMI  [BMI 40+ - review exclusion criteria]    Y  2. Are you able to give consent for your medical care? [IF NO,RN REVIEW]        N  3. Are you taking any prescription pain medications on a routine schedule?        3a. EXTENDED PREP What kind of prescription?     N 4. Do you have any chemical dependencies such as alcohol, street drugs, or methadone?    N 5. Do you have any history of post-traumatic stress syndrome, severe anxiety or history of psychosis?      **If yes 3- 5 , please schedule with MAC sedation.**          IF YES TO ANY 6 - 10 - HOSPITAL SETTING ONLY.     N 6.   Do you need assistance transferring?     N 7.   Have you had a heart or lung transplant?    N 8.   Are you currently on dialysis?   N 9.   Do you use daily home oxygen?   N 10. Do you take nitroglycerin?   10a.  If yes, how often?     11. [FEMALES]  N Are you currently pregnant?    11a.  If yes, how many weeks? [ Greater than 12 weeks, OR NEEDED]    N 12. Do you have Pulmonary Hypertension? *NEED PAC APPT AT [facility]*     N 13. [review exclusion criteria]  Do you have any implantable devices in your body (pacemaker, defib, LVAD)?    N 14. In the past 6 months, have you had any heart related issues including cardiomyopathy or heart attack?     14a.  If yes, did it require cardiac stenting if so when?     N 15. Have you had a stroke or Transient ischemic attack (TIA - aka  mini stroke ) within 6 months?      N 16. Do you have mod to severe Obstructive Sleep Apnea?  [Hospital only - Ok at [place]]    N 17. Do you have SEVERE AND UNCONTROLLED asthma? *NEED PAC APPT AT [facility]*     N 18. Are you currently taking any blood thinners?  "    18a. If yes, inform patient to \"follow up w/ ordering provider for bridging instructions.\"    N 19. Do you take the medication Phentermine?    19a. If yes, \"Hold for 7 days before procedure.  Please consult your prescribing provider if you have questions about holding this medication.\"     N  20. Do you have chronic kidney disease?      N  21. Do you have a diagnosis of diabetes?     N  22. On a regular basis do you go 3-5 days between bowel movements?      23. Preferred LOCAL Pharmacy for Pre Prescription    [ LIST ONLY ONE PHARMACY]     [place], [place] - [address]        - CLOSING REMINDERS -    Informed patient they will need an adult    Cannot take any type of public or medical transportation alone    Conscious Sedation- Needs  for 6 hours after the procedure       MAC/General-Needs  for 24 hours after procedure    Pre-Procedure Covid test to be completed [[facility] PCR Testing Required]    Confirmed Nurse will call to complete assessment       - SCHEDULING DETAILS -     TYLER  Surgeon    1/16  Date of Procedure  Lower Endoscopy [Colonoscopy]  Type of Procedure Scheduled   [facility] PREP-If you answer yes to questions #8, #20, #21Which Colonoscopy Prep was Sent?     MODERATE Sedation Type     N PAC / Pre-op Required         Additional comments:            "

## 2022-10-31 ENCOUNTER — HOSPITAL ENCOUNTER (OUTPATIENT)
Dept: MAMMOGRAPHY | Facility: CLINIC | Age: 62
Discharge: HOME | End: 2022-10-31
Attending: PHYSICIAN ASSISTANT | Admitting: PHYSICIAN ASSISTANT
Payer: COMMERCIAL

## 2022-10-31 DIAGNOSIS — Z12.31 VISIT FOR SCREENING MAMMOGRAM: ICD-10-CM

## 2022-10-31 PROCEDURE — 77067 SCR MAMMO BI INCL CAD: CPT

## 2022-11-10 ENCOUNTER — HOSPITAL ENCOUNTER (OUTPATIENT)
Dept: ULTRASOUND IMAGING | Facility: CLINIC | Age: 62
Discharge: HOME | End: 2022-11-10
Attending: PHYSICIAN ASSISTANT
Payer: COMMERCIAL

## 2022-11-10 ENCOUNTER — HOSPITAL ENCOUNTER (OUTPATIENT)
Dept: MAMMOGRAPHY | Facility: CLINIC | Age: 62
Discharge: HOME | End: 2022-11-10
Attending: PHYSICIAN ASSISTANT
Payer: COMMERCIAL

## 2022-11-10 DIAGNOSIS — R92.8 ABNORMAL MAMMOGRAM: Primary | ICD-10-CM

## 2022-11-10 DIAGNOSIS — R92.8 ABNORMAL MAMMOGRAM: ICD-10-CM

## 2022-11-10 PROCEDURE — 77061 BREAST TOMOSYNTHESIS UNI: CPT | Mod: RT

## 2022-11-10 PROCEDURE — 76642 ULTRASOUND BREAST LIMITED: CPT | Mod: RT

## 2022-12-12 ENCOUNTER — OFFICE VISIT (OUTPATIENT)
Dept: OBGYN | Facility: CLINIC | Age: 62
End: 2022-12-12
Payer: COMMERCIAL

## 2022-12-12 VITALS — DIASTOLIC BLOOD PRESSURE: 78 MMHG | SYSTOLIC BLOOD PRESSURE: 118 MMHG

## 2022-12-12 DIAGNOSIS — R87.613 HSIL (HIGH GRADE SQUAMOUS INTRAEPITHELIAL LESION) ON PAP SMEAR OF CERVIX: Primary | ICD-10-CM

## 2022-12-12 PROCEDURE — 87624 HPV HI-RISK TYP POOLED RSLT: CPT | Performed by: OBSTETRICS & GYNECOLOGY

## 2022-12-12 PROCEDURE — 88175 CYTOPATH C/V AUTO FLUID REDO: CPT | Performed by: OBSTETRICS & GYNECOLOGY

## 2022-12-12 PROCEDURE — 99213 OFFICE O/P EST LOW 20 MIN: CPT | Performed by: OBSTETRICS & GYNECOLOGY

## 2022-12-12 NOTE — PROGRESS NOTES
SUBJECTIVE:                                                   CC:  Patient presents with:  Follow Up: Repeat vaginal pap      HPI:  Uyen Rodney is a 62 year old with history of ARMANDO 3 s/p hysterectomy who presents today for vaginal pap.  No issues, no bleeding.  Here today with her daughter.    Of note, they prefer written communication with letters versus a call with an  for follow-up results.     9/17/21 HSIL, +HPV 16 & other HR type. Plan [place] bef 12/17/21 9/24/21 Left message with help from interpretor to call back  9/28/21 Visit with PCP: Pap/HPV results discussed with patient / result letter sent with [place] instructions  10/19/21 [place]: Cervical Bx's x 4 = ARMANDO 3. ECC = ARMANDO 3.  Plan LEEP  12/22/21 Hysterectomy pathology = ARMANDO 2/3, endocervical gland involvement. No cancer. Plan paps until age 86. Co-test due 12/22/22 12/12/22 appt    Gyn History:  No LMP recorded. Patient has had a hysterectomy.     Last 3 Pap and HPV Results:   PAP / HPV Latest Ref Rng & Units 9/17/2021   PAP   High-grade squamous intraepithelial lesion (HSIL) with features suspicious for invasion(A)   HPV16 Negative Positive(A)   HPV18 Negative Negative   HRHPV Negative Positive(A)       PMH, PSH, Soc Hx, Fam Hx, Meds, and allergies reviewed in Epic.    OBJECTIVE:     /78 (BP Location: Right arm, Patient Position: Chair, Cuff Size: Adult Regular)   Breastfeeding No     Gen: Healthy appearing female, no acute distress, comfortable  Psych: mentation appears normal and affect bright  : Normal external female genitalia.  No external lesions.   SSE: Speculum exam reveals vaginal epithelium atrophic c/w menopausal status.  Vaginal cuff is well healed.  Vaginal pap obtained.     ASSESSMENT/PLAN:                                                      1. HSIL (high grade squamous intraepithelial lesion) on Pap smear of cervix  Plan paps until age 86.    - Pap imaged thin layer screen with HPV - recommended age 30 -  65    Karen Sky MD, MPH  Obstetrics and Gynecology

## 2022-12-12 NOTE — NURSING NOTE
"Chief Complaint   Patient presents with     Follow Up     Repeat vaginal pap       Initial /78 (BP Location: Right arm, Patient Position: Chair, Cuff Size: Adult Regular)   Breastfeeding No  Estimated body mass index is 24.37 kg/m  as calculated from the following:    Height as of 10/10/22: 1.53 m (5' 0.25\").    Weight as of 10/10/22: 57.1 kg (125 lb 12.8 oz).  BP completed using cuff size: regular    Questioned patient about current smoking habits.  Pt. has never smoked.      No obstetric history on file.    The following HM Due: NONE    Gaby Walton CMA    "

## 2022-12-14 LAB
BKR LAB AP GYN ADEQUACY: NORMAL
BKR LAB AP GYN INTERPRETATION: NORMAL
BKR LAB AP HPV REFLEX: NORMAL
BKR LAB AP PREVIOUS ABNL DX: NORMAL
BKR LAB AP PREVIOUS ABNORMAL: NORMAL
PATH REPORT.COMMENTS IMP SPEC: NORMAL
PATH REPORT.COMMENTS IMP SPEC: NORMAL
PATH REPORT.RELEVANT HX SPEC: NORMAL

## 2022-12-15 LAB
HUMAN PAPILLOMA VIRUS 16 DNA: NEGATIVE
HUMAN PAPILLOMA VIRUS 18 DNA: NEGATIVE
HUMAN PAPILLOMA VIRUS FINAL DIAGNOSIS: NORMAL
HUMAN PAPILLOMA VIRUS OTHER HR: NEGATIVE

## 2022-12-16 RX ORDER — BISACODYL 5 MG
TABLET, DELAYED RELEASE (ENTERIC COATED) ORAL
Qty: 4 TABLET | Refills: 0 | Status: SHIPPED | OUTPATIENT
Start: 2022-12-16 | End: 2023-04-18

## 2022-12-19 ENCOUNTER — PATIENT OUTREACH (OUTPATIENT)
Dept: OBGYN | Facility: CLINIC | Age: 62
End: 2022-12-19

## 2022-12-19 DIAGNOSIS — R87.613 HSIL (HIGH GRADE SQUAMOUS INTRAEPITHELIAL LESION) ON PAP SMEAR OF CERVIX: Primary | ICD-10-CM

## 2023-01-16 ENCOUNTER — HOSPITAL ENCOUNTER (OUTPATIENT)
Facility: CLINIC | Age: 63
Discharge: HOME | End: 2023-01-16
Attending: INTERNAL MEDICINE | Admitting: INTERNAL MEDICINE
Payer: COMMERCIAL

## 2023-01-16 VITALS
DIASTOLIC BLOOD PRESSURE: 80 MMHG | OXYGEN SATURATION: 97 % | RESPIRATION RATE: 16 BRPM | TEMPERATURE: 97.8 F | HEART RATE: 68 BPM | SYSTOLIC BLOOD PRESSURE: 135 MMHG | BODY MASS INDEX: 24.35 KG/M2 | HEIGHT: 61 IN | WEIGHT: 129 LBS

## 2023-01-16 DIAGNOSIS — Z12.11 COLON CANCER SCREENING: Primary | ICD-10-CM

## 2023-01-16 LAB — COLONOSCOPY: NORMAL

## 2023-01-16 PROCEDURE — 250N000011 HC RX IP 250 OP 636: Performed by: INTERNAL MEDICINE

## 2023-01-16 PROCEDURE — G0500 MOD SEDAT ENDO SERVICE >5YRS: HCPCS | Performed by: INTERNAL MEDICINE

## 2023-01-16 PROCEDURE — 45378 DIAGNOSTIC COLONOSCOPY: CPT | Performed by: INTERNAL MEDICINE

## 2023-01-16 PROCEDURE — G0121 COLON CA SCRN NOT HI RSK IND: HCPCS | Performed by: INTERNAL MEDICINE

## 2023-01-16 RX ORDER — NALOXONE HYDROCHLORIDE 0.4 MG/ML
0.4 INJECTION, SOLUTION INTRAMUSCULAR; INTRAVENOUS; SUBCUTANEOUS
Status: DISCONTINUED | OUTPATIENT
Start: 2023-01-16 | End: 2023-01-16 | Stop reason: HOSPADM

## 2023-01-16 RX ORDER — DIPHENHYDRAMINE HYDROCHLORIDE 50 MG/ML
25-50 INJECTION INTRAMUSCULAR; INTRAVENOUS
Status: DISCONTINUED | OUTPATIENT
Start: 2023-01-16 | End: 2023-01-16 | Stop reason: HOSPADM

## 2023-01-16 RX ORDER — LIDOCAINE 40 MG/G
CREAM TOPICAL
Status: DISCONTINUED | OUTPATIENT
Start: 2023-01-16 | End: 2023-01-16 | Stop reason: HOSPADM

## 2023-01-16 RX ORDER — SIMETHICONE 40MG/0.6ML
133 SUSPENSION, DROPS(FINAL DOSAGE FORM)(ML) ORAL
Status: DISCONTINUED | OUTPATIENT
Start: 2023-01-16 | End: 2023-01-16 | Stop reason: HOSPADM

## 2023-01-16 RX ORDER — FLUMAZENIL 0.1 MG/ML
0.2 INJECTION, SOLUTION INTRAVENOUS
Status: DISCONTINUED | OUTPATIENT
Start: 2023-01-16 | End: 2023-01-16 | Stop reason: HOSPADM

## 2023-01-16 RX ORDER — EPINEPHRINE 1 MG/ML
0.1 INJECTION, SOLUTION INTRAMUSCULAR; SUBCUTANEOUS
Status: DISCONTINUED | OUTPATIENT
Start: 2023-01-16 | End: 2023-01-16 | Stop reason: HOSPADM

## 2023-01-16 RX ORDER — NALOXONE HYDROCHLORIDE 0.4 MG/ML
0.2 INJECTION, SOLUTION INTRAMUSCULAR; INTRAVENOUS; SUBCUTANEOUS
Status: DISCONTINUED | OUTPATIENT
Start: 2023-01-16 | End: 2023-01-16 | Stop reason: HOSPADM

## 2023-01-16 RX ORDER — ATROPINE SULFATE 0.1 MG/ML
1 INJECTION INTRAVENOUS
Status: DISCONTINUED | OUTPATIENT
Start: 2023-01-16 | End: 2023-01-16 | Stop reason: HOSPADM

## 2023-01-16 RX ORDER — ONDANSETRON 4 MG/1
4 TABLET, ORALLY DISINTEGRATING ORAL EVERY 6 HOURS PRN
Status: DISCONTINUED | OUTPATIENT
Start: 2023-01-16 | End: 2023-01-16 | Stop reason: HOSPADM

## 2023-01-16 RX ORDER — PROCHLORPERAZINE MALEATE 10 MG
10 TABLET ORAL EVERY 6 HOURS PRN
Status: DISCONTINUED | OUTPATIENT
Start: 2023-01-16 | End: 2023-01-16 | Stop reason: HOSPADM

## 2023-01-16 RX ORDER — FENTANYL CITRATE 50 UG/ML
50-100 INJECTION, SOLUTION INTRAMUSCULAR; INTRAVENOUS EVERY 5 MIN PRN
Status: DISCONTINUED | OUTPATIENT
Start: 2023-01-16 | End: 2023-01-16 | Stop reason: HOSPADM

## 2023-01-16 RX ORDER — ONDANSETRON 2 MG/ML
4 INJECTION INTRAMUSCULAR; INTRAVENOUS EVERY 6 HOURS PRN
Status: DISCONTINUED | OUTPATIENT
Start: 2023-01-16 | End: 2023-01-16 | Stop reason: HOSPADM

## 2023-01-16 RX ORDER — ONDANSETRON 2 MG/ML
4 INJECTION INTRAMUSCULAR; INTRAVENOUS
Status: DISCONTINUED | OUTPATIENT
Start: 2023-01-16 | End: 2023-01-16 | Stop reason: HOSPADM

## 2023-01-16 RX ADMIN — MIDAZOLAM 2 MG: 1 INJECTION INTRAMUSCULAR; INTRAVENOUS at 08:06

## 2023-01-16 RX ADMIN — FENTANYL CITRATE 100 MCG: 50 INJECTION, SOLUTION INTRAMUSCULAR; INTRAVENOUS at 08:07

## 2023-01-16 ASSESSMENT — ACTIVITIES OF DAILY LIVING (ADL): ADLS_ACUITY_SCORE: 35

## 2023-01-16 NOTE — H&P
[facility] Anesthesia Pre-op History and Physical    Thi Telma Rodney MRN# [id number]   Age: 62 year old YOB: 1960      Date of Surgery: today Location [facility]      Date of Exam 1/16/2023 Facility (Same day)       Home clinic: unknown  Primary care provider: Clinic - [facility]         Chief Complaint and/or Reason for Procedure:   No chief complaint on file.           Active problem list:     Patient Active Problem List    Diagnosis Date Noted     S/P hysterectomy 02/14/2022     Priority: Medium     Seen by OB/GYN, recommended pap smears until 86 years old.       Hyperlipidemia LDL goal <130 09/28/2021     Priority: Medium     The 10-year ASCVD risk score (Ramona MONSALVE Jr., et al., 2013) is: 8.9%    Values used to calculate the score:      Age: 61 years      Sex: Female      Is Non- : No      Diabetic: No      Tobacco smoker: No      Systolic Blood Pressure: 148 mmHg      Is BP treated: Yes      HDL Cholesterol: 39 mg/dL      Total Cholesterol: 205 mg/dL         Benign essential hypertension 09/17/2021     Priority: Medium     HSIL (high grade squamous intraepithelial lesion) on Pap smear of cervix 09/17/2021     Priority: Medium     9/17/21 HSIL, +HPV 16 & other HR type. Plan [place] bef 12/17/21 9/24/21 Left message with help from interpretor to call back  9/28/21 Visit with PCP: Pap/HPV results discussed with patient / result letter sent with [place] instructions  10/19/21 [place]: Cervical Bx's x 4 = ARMANDO 3. ECC = ARMANDO 3.  Plan LEEP  12/22/21 Hysterectomy pathology = ARMANDO 2/3, endocervical gland involvement. No cancer. Plan paps until age 86. Co-test due 12/22/22 12/12/22 NIL vaginal pap, Neg HPV. Plan cotest in 1 year due by 12/12/23              Medications (include herbals and vitamins):   Any Plavix use in the last 7 days? No     Current Facility-Administered Medications   Medication     0.9% sodium chloride BOLUS     atropine  "injection 1 mg     benzocaine 20% (HURRICAINE/TOPEX) 20 % spray 0.5 mL     diphenhydrAMINE (BENADRYL) injection 25-50 mg     EPINEPHrine (Anaphylaxis) (ADRENALIN) injection (vial) 0.1 mg     fentaNYL (PF) (SUBLIMAZE) injection  mcg     flumazenil (ROMAZICON) injection 0.2 mg     glucagon injection 0.5 mg     lidocaine (LMX4) kit     lidocaine 1 % 0.1-1 mL     midazolam (VERSED) injection 0.5-2 mg     naloxone (NARCAN) injection 0.2 mg    Or     naloxone (NARCAN) injection 0.4 mg    Or     naloxone (NARCAN) injection 0.2 mg    Or     naloxone (NARCAN) injection 0.4 mg     ondansetron (ZOFRAN) injection 4 mg     simethicone (MYLICON) suspension 133 mg     sodium chloride (PF) 0.9% PF flush 3 mL     sodium chloride (PF) 0.9% PF flush 3 mL     sodium chloride (PF) 0.9% PF flush 3 mL             Allergies:    No Known Allergies  Allergy to Latex? No  Allergy to tape?   No  Intolerances: NKDA            Physical Exam:   All vitals have been reviewed  Patient Vitals for the past 8 hrs:   BP Temp Temp src Pulse Resp SpO2 Height Weight   01/16/23 0741 (!) 153/89 97.8  F (36.6  C) Temporal 72 16 100 % 1.56 m (5' 1.42\") 58.5 kg (129 lb)   01/16/23 0740 (!) 153/89 -- -- 71 16 100 % -- --     No intake/output data recorded.  Airway assessment:   Patient is able to open mouth wide  Patient is able to stick out tongue}              Lab / Radiology Results:             Anesthetic risk and/or ASA classification:       Trinidad Busby MD     "

## 2023-04-18 ENCOUNTER — OFFICE VISIT (OUTPATIENT)
Dept: FAMILY MEDICINE | Facility: CLINIC | Age: 63
End: 2023-04-18
Payer: COMMERCIAL

## 2023-04-18 VITALS
HEART RATE: 71 BPM | DIASTOLIC BLOOD PRESSURE: 78 MMHG | WEIGHT: 123 LBS | BODY MASS INDEX: 24.15 KG/M2 | TEMPERATURE: 98.2 F | RESPIRATION RATE: 12 BRPM | HEIGHT: 60 IN | SYSTOLIC BLOOD PRESSURE: 142 MMHG | OXYGEN SATURATION: 99 %

## 2023-04-18 DIAGNOSIS — I10 BENIGN ESSENTIAL HYPERTENSION: ICD-10-CM

## 2023-04-18 DIAGNOSIS — E78.5 HYPERLIPIDEMIA LDL GOAL <130: ICD-10-CM

## 2023-04-18 LAB
ALBUMIN SERPL BCG-MCNC: 4.7 G/DL (ref 3.5–5.2)
ALP SERPL-CCNC: 81 U/L (ref 35–104)
ALT SERPL W P-5'-P-CCNC: 21 U/L (ref 10–35)
ANION GAP SERPL CALCULATED.3IONS-SCNC: 10 MMOL/L (ref 7–15)
AST SERPL W P-5'-P-CCNC: 27 U/L (ref 10–35)
BILIRUB SERPL-MCNC: 0.6 MG/DL
BUN SERPL-MCNC: 14.7 MG/DL (ref 8–23)
CALCIUM SERPL-MCNC: 10.1 MG/DL (ref 8.8–10.2)
CHLORIDE SERPL-SCNC: 106 MMOL/L (ref 98–107)
CHOLEST SERPL-MCNC: 143 MG/DL
CREAT SERPL-MCNC: 0.8 MG/DL (ref 0.51–0.95)
DEPRECATED HCO3 PLAS-SCNC: 25 MMOL/L (ref 22–29)
ERYTHROCYTE [DISTWIDTH] IN BLOOD BY AUTOMATED COUNT: 12.1 % (ref 10–15)
GFR SERPL CREATININE-BSD FRML MDRD: 82 ML/MIN/1.73M2
GLUCOSE SERPL-MCNC: 97 MG/DL (ref 70–99)
HCT VFR BLD AUTO: 35.6 % (ref 35–47)
HDLC SERPL-MCNC: 42 MG/DL
HGB BLD-MCNC: 11.9 G/DL (ref 11.7–15.7)
LDLC SERPL CALC-MCNC: 71 MG/DL
MCH RBC QN AUTO: 29.8 PG (ref 26.5–33)
MCHC RBC AUTO-ENTMCNC: 33.4 G/DL (ref 31.5–36.5)
MCV RBC AUTO: 89 FL (ref 78–100)
NONHDLC SERPL-MCNC: 101 MG/DL
PLATELET # BLD AUTO: 264 10E3/UL (ref 150–450)
POTASSIUM SERPL-SCNC: 5.4 MMOL/L (ref 3.4–5.3)
PROT SERPL-MCNC: 8.9 G/DL (ref 6.4–8.3)
RBC # BLD AUTO: 3.99 10E6/UL (ref 3.8–5.2)
SODIUM SERPL-SCNC: 141 MMOL/L (ref 136–145)
TRIGL SERPL-MCNC: 148 MG/DL
WBC # BLD AUTO: 7.5 10E3/UL (ref 4–11)

## 2023-04-18 PROCEDURE — 99214 OFFICE O/P EST MOD 30 MIN: CPT | Performed by: FAMILY MEDICINE

## 2023-04-18 PROCEDURE — 36415 COLL VENOUS BLD VENIPUNCTURE: CPT | Performed by: FAMILY MEDICINE

## 2023-04-18 PROCEDURE — 80053 COMPREHEN METABOLIC PANEL: CPT | Performed by: FAMILY MEDICINE

## 2023-04-18 PROCEDURE — 85027 COMPLETE CBC AUTOMATED: CPT | Performed by: FAMILY MEDICINE

## 2023-04-18 PROCEDURE — 80061 LIPID PANEL: CPT | Performed by: FAMILY MEDICINE

## 2023-04-18 RX ORDER — SIMVASTATIN 10 MG
10 TABLET ORAL AT BEDTIME
Qty: 90 TABLET | Refills: 3 | Status: SHIPPED | OUTPATIENT
Start: 2023-04-18 | End: 2024-04-24

## 2023-04-18 RX ORDER — LISINOPRIL 20 MG/1
20 TABLET ORAL DAILY
Qty: 90 TABLET | Refills: 3 | Status: SHIPPED | OUTPATIENT
Start: 2023-04-18 | End: 2024-04-24

## 2023-04-18 RX ORDER — LISINOPRIL 20 MG/1
20 TABLET ORAL DAILY
Qty: 90 TABLET | Refills: 1 | Status: CANCELLED | OUTPATIENT
Start: 2023-04-18

## 2023-04-18 RX ORDER — SIMVASTATIN 10 MG
10 TABLET ORAL AT BEDTIME
Qty: 90 TABLET | Refills: 2 | Status: CANCELLED | OUTPATIENT
Start: 2023-04-18

## 2023-04-18 NOTE — PROGRESS NOTES
Assessment & Plan     Benign essential hypertension  Slightly elevated today, continue home monitoring.  Continue current lisinopril dose.  - Comprehensive metabolic panel (BMP + Alb, Alk Phos, ALT, AST, Total. Bili, TP)  - CBC with platelets  - lisinopril (ZESTRIL) 20 MG tablet; Take 1 tablet (20 mg) by mouth daily    Hyperlipidemia LDL goal <130  Due for labs, continue simvastatin, recommendations pending results of labs.  - Lipid panel reflex to direct LDL Fasting  - simvastatin (ZOCOR) 10 MG tablet; Take 1 tablet (10 mg) by mouth At Bedtime      20 minutes spent by me on the date of the encounter doing chart review, history and exam, documentation and further activities per the note       See Patient Instructions    Berta Moy MD  [facility]    Konrad Qiu is a 62 year old, presenting for the following health issues:  Recheck Medication         View : No data to display.              HPI     Hyperlipidemia Follow-Up      Are you regularly taking any medication or supplement to lower your cholesterol?   Yes- simvastatin     Are you having muscle aches or other side effects that you think could be caused by your cholesterol lowering medication?  No    Hypertension Follow-up      Do you check your blood pressure regularly outside of the clinic? Yes     Are you following a low salt diet? Yes    Are your blood pressures ever more than 140 on the top number (systolic) OR more   than 90 on the bottom number (diastolic), for example 140/90? Yes      How many servings of fruits and vegetables do you eat daily?  2-3    On average, how many sweetened beverages do you drink each day (Examples: soda, juice, sweet tea, etc.  Do NOT count diet or artificially sweetened beverages)?   0    How many days per week do you exercise enough to make your heart beat faster? 7    How many minutes a day do you exercise enough to make your heart beat faster? 20 - 29    How many days per week do  you miss taking your medication? 0    Blood pressures at home are often in the 140s, but takes her medication and pressures go down.  Denies chest pain, headaches, dizziness, or visual disturbances.    Uses saline nasal spray for dry nose, sometimes has blood, but after saline helps.      Review of Systems   Constitutional, HEENT, cardiovascular, pulmonary, gi and gu systems are negative, except as otherwise noted.      Objective    BP (!) 142/78 (BP Location: Right arm, Patient Position: Chair, Cuff Size: Adult Regular)   Pulse 71   Temp 98.2  F (36.8  C) (Oral)   Resp 12   Ht 1.524 m (5')   Wt 55.8 kg (123 lb)   SpO2 99%   BMI 24.02 kg/m    Body mass index is 24.02 kg/m .  Physical Exam   GENERAL: healthy, alert and no distress  RESP: lungs clear to auscultation - no rales, rhonchi or wheezes  CV: regular rate and rhythm, normal S1 S2, no S3 or S4, no murmur, click or rub, no peripheral edema and peripheral pulses strong    Labs reviewed in chart

## 2023-04-30 DIAGNOSIS — E87.5 HYPERKALEMIA: Primary | ICD-10-CM

## 2023-05-01 ENCOUNTER — TELEPHONE (OUTPATIENT)
Dept: FAMILY MEDICINE | Facility: CLINIC | Age: 63
End: 2023-05-01
Payer: COMMERCIAL

## 2023-05-01 NOTE — TELEPHONE ENCOUNTER
----- Message from April Courtney Moy MD sent at 4/30/2023  1:21 PM CDT -----  Please call patient and let her know that her cholesterol was well controlled, continue current statin dose.  CBC is normal.  Her metabolic panel showed that overall her kidneys and liver are working well, her potassium level was slightly elevated.  Recommend rechecking this in 1 to 2 weeks, orders have been placed, she can schedule a visit.

## 2023-05-01 NOTE — TELEPHONE ENCOUNTER
Pt and daughter calls, pt gives verbal consent, informed, lab appointment scheduled, recommend fill out CTC at lab appointment and can call daughter with results  Ekta Hudson RN, BSN  [facility]

## 2023-05-04 ENCOUNTER — LAB (OUTPATIENT)
Dept: LAB | Facility: CLINIC | Age: 63
End: 2023-05-04
Payer: COMMERCIAL

## 2023-05-04 DIAGNOSIS — E87.5 HYPERKALEMIA: ICD-10-CM

## 2023-05-04 LAB — POTASSIUM SERPL-SCNC: 4.6 MMOL/L (ref 3.4–5.3)

## 2023-05-04 PROCEDURE — 36415 COLL VENOUS BLD VENIPUNCTURE: CPT

## 2023-05-04 PROCEDURE — 84132 ASSAY OF SERUM POTASSIUM: CPT

## 2023-05-15 ENCOUNTER — HOSPITAL ENCOUNTER (OUTPATIENT)
Dept: ULTRASOUND IMAGING | Facility: CLINIC | Age: 63
Discharge: HOME | End: 2023-05-15
Attending: PHYSICIAN ASSISTANT | Admitting: PHYSICIAN ASSISTANT
Payer: COMMERCIAL

## 2023-05-15 DIAGNOSIS — R92.8 ABNORMAL MAMMOGRAM: Primary | ICD-10-CM

## 2023-05-15 DIAGNOSIS — R92.8 ABNORMAL MAMMOGRAM: ICD-10-CM

## 2023-05-15 DIAGNOSIS — Z12.31 VISIT FOR SCREENING MAMMOGRAM: ICD-10-CM

## 2023-05-15 PROCEDURE — 76642 ULTRASOUND BREAST LIMITED: CPT | Mod: RT

## 2023-07-31 ENCOUNTER — OFFICE VISIT (OUTPATIENT)
Dept: FAMILY MEDICINE | Facility: CLINIC | Age: 63
End: 2023-07-31
Payer: COMMERCIAL

## 2023-07-31 VITALS
HEIGHT: 60 IN | BODY MASS INDEX: 24.62 KG/M2 | SYSTOLIC BLOOD PRESSURE: 122 MMHG | RESPIRATION RATE: 18 BRPM | DIASTOLIC BLOOD PRESSURE: 65 MMHG | OXYGEN SATURATION: 97 % | TEMPERATURE: 97.9 F | HEART RATE: 68 BPM | WEIGHT: 125.4 LBS

## 2023-07-31 DIAGNOSIS — Z00.00 ROUTINE GENERAL MEDICAL EXAMINATION AT A HEALTH CARE FACILITY: Primary | ICD-10-CM

## 2023-07-31 DIAGNOSIS — Z23 NEED FOR VACCINATION: ICD-10-CM

## 2023-07-31 LAB
HBA1C MFR BLD: 5.8 % (ref 0–5.6)
TSH SERPL DL<=0.005 MIU/L-ACNC: 2.64 UIU/ML (ref 0.3–4.2)

## 2023-07-31 PROCEDURE — 90750 HZV VACC RECOMBINANT IM: CPT | Performed by: FAMILY MEDICINE

## 2023-07-31 PROCEDURE — 84443 ASSAY THYROID STIM HORMONE: CPT | Performed by: FAMILY MEDICINE

## 2023-07-31 PROCEDURE — 36415 COLL VENOUS BLD VENIPUNCTURE: CPT | Performed by: FAMILY MEDICINE

## 2023-07-31 PROCEDURE — 90471 IMMUNIZATION ADMIN: CPT | Performed by: FAMILY MEDICINE

## 2023-07-31 PROCEDURE — 83036 HEMOGLOBIN GLYCOSYLATED A1C: CPT | Performed by: FAMILY MEDICINE

## 2023-07-31 PROCEDURE — 99396 PREV VISIT EST AGE 40-64: CPT | Mod: 25 | Performed by: FAMILY MEDICINE

## 2023-07-31 SDOH — HEALTH STABILITY: PHYSICAL HEALTH: ON AVERAGE, HOW MANY DAYS PER WEEK DO YOU ENGAGE IN MODERATE TO STRENUOUS EXERCISE (LIKE A BRISK WALK)?: 3 DAYS

## 2023-07-31 SDOH — ECONOMIC STABILITY: INCOME INSECURITY: HOW HARD IS IT FOR YOU TO PAY FOR THE VERY BASICS LIKE FOOD, HOUSING, MEDICAL CARE, AND HEATING?: NOT HARD AT ALL

## 2023-07-31 SDOH — ECONOMIC STABILITY: TRANSPORTATION INSECURITY
IN THE PAST 12 MONTHS, HAS LACK OF TRANSPORTATION KEPT YOU FROM MEETINGS, WORK, OR FROM GETTING THINGS NEEDED FOR DAILY LIVING?: NO

## 2023-07-31 SDOH — ECONOMIC STABILITY: TRANSPORTATION INSECURITY
IN THE PAST 12 MONTHS, HAS THE LACK OF TRANSPORTATION KEPT YOU FROM MEDICAL APPOINTMENTS OR FROM GETTING MEDICATIONS?: NO

## 2023-07-31 SDOH — ECONOMIC STABILITY: INCOME INSECURITY: IN THE LAST 12 MONTHS, WAS THERE A TIME WHEN YOU WERE NOT ABLE TO PAY THE MORTGAGE OR RENT ON TIME?: NO

## 2023-07-31 SDOH — ECONOMIC STABILITY: FOOD INSECURITY: WITHIN THE PAST 12 MONTHS, THE FOOD YOU BOUGHT JUST DIDN'T LAST AND YOU DIDN'T HAVE MONEY TO GET MORE.: NEVER TRUE

## 2023-07-31 SDOH — HEALTH STABILITY: PHYSICAL HEALTH: ON AVERAGE, HOW MANY MINUTES DO YOU ENGAGE IN EXERCISE AT THIS LEVEL?: 30 MIN

## 2023-07-31 SDOH — ECONOMIC STABILITY: FOOD INSECURITY: WITHIN THE PAST 12 MONTHS, YOU WORRIED THAT YOUR FOOD WOULD RUN OUT BEFORE YOU GOT MONEY TO BUY MORE.: NEVER TRUE

## 2023-07-31 ASSESSMENT — ENCOUNTER SYMPTOMS
HEADACHES: 0
NERVOUS/ANXIOUS: 0
DIARRHEA: 0
FREQUENCY: 0
EYE PAIN: 0
PALPITATIONS: 0
SHORTNESS OF BREATH: 0
ABDOMINAL PAIN: 0
SORE THROAT: 0
HEMATURIA: 0
FEVER: 0
MYALGIAS: 0
DIZZINESS: 0
HEARTBURN: 0
NAUSEA: 0
PARESTHESIAS: 0
CONSTIPATION: 0
JOINT SWELLING: 0
HEMATOCHEZIA: 0
DYSURIA: 0
WEAKNESS: 0
ARTHRALGIAS: 0
CHILLS: 0
BREAST MASS: 0
COUGH: 0

## 2023-07-31 ASSESSMENT — SOCIAL DETERMINANTS OF HEALTH (SDOH)
HOW OFTEN DO YOU GET TOGETHER WITH FRIENDS OR RELATIVES?: MORE THAN THREE TIMES A WEEK
IN A TYPICAL WEEK, HOW MANY TIMES DO YOU TALK ON THE PHONE WITH FAMILY, FRIENDS, OR NEIGHBORS?: MORE THAN THREE TIMES A WEEK
HOW OFTEN DO YOU ATTEND CHURCH OR RELIGIOUS SERVICES?: NEVER
DO YOU BELONG TO ANY CLUBS OR ORGANIZATIONS SUCH AS CHURCH GROUPS UNIONS, FRATERNAL OR ATHLETIC GROUPS, OR SCHOOL GROUPS?: NO

## 2023-07-31 ASSESSMENT — LIFESTYLE VARIABLES
AUDIT-C TOTAL SCORE: 0
HOW MANY STANDARD DRINKS CONTAINING ALCOHOL DO YOU HAVE ON A TYPICAL DAY: PATIENT DOES NOT DRINK
HOW OFTEN DO YOU HAVE A DRINK CONTAINING ALCOHOL: NEVER
HOW OFTEN DO YOU HAVE SIX OR MORE DRINKS ON ONE OCCASION: NEVER
SKIP TO QUESTIONS 9-10: 1

## 2023-07-31 NOTE — PROGRESS NOTES
SUBJECTIVE:   CC: Uyen is an 63 year old who presents for preventive health visit.       7/31/2023     9:06 AM   Additional Questions   Roomed by Giselle LEAL CMA   Accompanied by      Here for wellness visit.    No concerns.    Healthy Habits:     Getting at least 3 servings of Calcium per day:  Yes    Bi-annual eye exam:  Yes    Dental care twice a year:  NO    Sleep apnea or symptoms of sleep apnea:  None    Diet:  Regular (no restrictions)    Frequency of exercise:  2-3 days/week    Duration of exercise:  15-30 minutes    Taking medications regularly:  Yes    Medication side effects:  None    Additional concerns today:  No    Have you ever done Advance Care Planning? (For example, a Health Directive, POLST, or a discussion with a medical provider or your loved ones about your wishes): No, advance care planning information given to patient to review.  Patient plans to discuss their wishes with loved ones or provider.      Social History     Tobacco Use     Smoking status: Never     Smokeless tobacco: Never   Substance Use Topics     Alcohol use: Never              No data to display              Reviewed orders with patient.  Reviewed health maintenance and updated orders accordingly - Yes  Lab work is in process  Labs reviewed in EPIC  BP Readings from Last 3 Encounters:   07/31/23 122/65   04/18/23 (!) 142/78   01/16/23 135/80    Wt Readings from Last 3 Encounters:   07/31/23 56.9 kg (125 lb 6.4 oz)   04/18/23 55.8 kg (123 lb)   01/16/23 58.5 kg (129 lb)                  Patient Active Problem List   Diagnosis     Benign essential hypertension     HSIL (high grade squamous intraepithelial lesion) on Pap smear of cervix     Hyperlipidemia LDL goal <130     S/P hysterectomy     Past Surgical History:   Procedure Laterality Date     COLONOSCOPY N/A 1/16/2023    Procedure: COLONOSCOPY;  Surgeon: Trinidad Busby MD;  Location:  GI     DAVINCI HYSTERECTOMY TOTAL, BILATERAL SALPINGO-OOPHORECTOMY, COMBINED N/A  12/22/2021    Procedure: Si Robotic Assisted TOTAL LAPAROSCOPIC HYSTERECTOMY, BILATERAL SALPINGO-OOPHORECTOMY;  Surgeon: Karen Sky MD;  Location: [facility] OR     THYROIDECTOMY          Social History     Tobacco Use     Smoking status: Never     Smokeless tobacco: Never   Substance Use Topics     Alcohol use: Never     Family History   Problem Relation Age of Onset     Colon Cancer No family hx of          Current Outpatient Medications   Medication Sig Dispense Refill     lisinopril (ZESTRIL) 20 MG tablet Take 1 tablet (20 mg) by mouth daily 90 tablet 3     simvastatin (ZOCOR) 10 MG tablet Take 1 tablet (10 mg) by mouth At Bedtime 90 tablet 3     No Known Allergies  Recent Labs   Lab Test 05/04/23  0833 04/18/23  1027 03/28/22  1044 11/08/21  0810 09/17/21  0948   LDL  --  71 67  --  141*   HDL  --  42* 43*  --  39*   TRIG  --  148 95  --  127   ALT  --  21 24  --  27   CR  --  0.80 0.64   < > 0.67   GFRESTIMATED  --  82 >90   < > >90   POTASSIUM 4.6 5.4* 3.4   < > 4.1   TSH  --   --   --   --  2.32    < > = values in this interval not displayed.        Breast Cancer Screening:  Any new diagnosis of family breast, ovarian, or bowel cancer? No    FHS-7:       10/31/2022     9:16 AM   Breast CA Risk Assessment (FHS-7)   Did any of your first-degree relatives have breast or ovarian cancer? No   Did any of your relatives have bilateral breast cancer? No   Did any man in your family have breast cancer? No   Did any woman in your family have breast and ovarian cancer? No   Did any woman in your family have breast cancer before age 50 y? No   Do you have 2 or more relatives with breast and/or ovarian cancer? No   Do you have 2 or more relatives with breast and/or bowel cancer? No     click delete button to remove this line now  Mammogram Screening: Recommended mammography every 1-2 years with patient discussion and risk factor consideration  Pertinent mammograms are reviewed under the imaging tab.    History of  abnormal Pap smear: YES - updated in Problem List and Health Maintenance accordingly      Latest Ref Rng & Units 12/12/2022    10:45 AM 9/17/2021     9:36 AM   PAP / HPV   PAP  Negative for Intraepithelial Lesion or Malignancy (NILM)  High-grade squamous intraepithelial lesion (HSIL) with features suspicious for invasion    HPV 16 DNA Negative Negative  Positive    HPV 18 DNA Negative Negative  Negative    Other HR HPV Negative Negative  Positive      Reviewed and updated as needed this visit by clinical staff   Tobacco   Meds              Reviewed and updated as needed this visit by Provider                 Past Medical History:   Diagnosis Date     Abnormal Pap smear of cervix 09/17/2021     Cervical high risk HPV (human papillomavirus) test positive 09/17/2021     Hypertension      Thyroid disease       Past Surgical History:   Procedure Laterality Date     COLONOSCOPY N/A 1/16/2023    Procedure: COLONOSCOPY;  Surgeon: Trinidad Busby MD;  Location: [facility] GI     DAVINCI HYSTERECTOMY TOTAL, BILATERAL SALPINGO-OOPHORECTOMY, COMBINED N/A 12/22/2021    Procedure: Si Robotic Assisted TOTAL LAPAROSCOPIC HYSTERECTOMY, BILATERAL SALPINGO-OOPHORECTOMY;  Surgeon: Karen Sky MD;  Location: [facility] OR     THYROIDECTOMY          Review of Systems   Constitutional:  Negative for chills and fever.   HENT:  Negative for congestion, ear pain, hearing loss and sore throat.    Eyes:  Negative for pain and visual disturbance.   Respiratory:  Negative for cough and shortness of breath.    Cardiovascular:  Negative for chest pain, palpitations and peripheral edema.   Gastrointestinal:  Negative for abdominal pain, constipation, diarrhea, heartburn, hematochezia and nausea.   Breasts:  Negative for tenderness, breast mass and discharge.   Genitourinary:  Negative for dysuria, frequency, genital sores, hematuria, pelvic pain, urgency, vaginal bleeding and vaginal discharge.   Musculoskeletal:  Negative for arthralgias, joint  swelling and myalgias.   Skin:  Negative for rash.   Neurological:  Negative for dizziness, weakness, headaches and paresthesias.   Psychiatric/Behavioral:  Negative for mood changes. The patient is not nervous/anxious.           OBJECTIVE:   /65 (BP Location: Right arm, Patient Position: Sitting, Cuff Size: Adult Regular)   Pulse 68   Temp 97.9  F (36.6  C) (Oral)   Resp 18   Ht 1.524 m (5')   Wt 56.9 kg (125 lb 6.4 oz)   SpO2 97%   BMI 24.49 kg/m    Physical Exam  GENERAL APPEARANCE: healthy, alert and no distress  EYES: Eyes grossly normal to inspection, PERRL and conjunctivae and sclerae normal  HENT: ear canals and TM's normal, nose and mouth without ulcers or lesions, oropharynx clear and oral mucous membranes moist  NECK: no adenopathy, no asymmetry, masses, or scars and thyroid normal to palpation  RESP: lungs clear to auscultation - no rales, rhonchi or wheezes  BREAST: normal without masses, tenderness or nipple discharge and no palpable axillary masses or adenopathy  CV: regular rate and rhythm, normal S1 S2, no S3 or S4, no murmur, click or rub, no peripheral edema and peripheral pulses strong  ABDOMEN: soft, nontender, no hepatosplenomegaly, no masses and bowel sounds normal  MS: no musculoskeletal defects are noted and gait is age appropriate without ataxia  SKIN: no suspicious lesions or rashes  NEURO: Normal strength and tone, sensory exam grossly normal, mentation intact and speech normal  PSYCH: mentation appears normal and affect normal/bright    Diagnostic Test Results:  Labs reviewed in Epic    ASSESSMENT/PLAN:       ICD-10-CM    1. Routine general medical examination at a health care facility  Z00.00 Hemoglobin A1c     TSH with free T4 reflex     Hemoglobin A1c     TSH with free T4 reflex      2. Need for vaccination  Z23 Zoster recombinant adjuvanted (shingrix)          Patient has been advised of split billing requirements and indicates understanding:  Yes      COUNSELING:  Reviewed preventive health counseling, as reflected in patient instructions        She reports that she has never smoked. She has never used smokeless tobacco.      Berta Moy MD  [facility]

## 2023-08-07 DIAGNOSIS — R73.03 PREDIABETES: Primary | ICD-10-CM

## 2023-08-08 ENCOUNTER — TELEPHONE (OUTPATIENT)
Dept: FAMILY MEDICINE | Facility: CLINIC | Age: 63
End: 2023-08-08
Payer: COMMERCIAL

## 2023-08-08 NOTE — TELEPHONE ENCOUNTER
"Call to patient.  Daughter answers. No consent to communicate on file. Daughter will call clinic tomorrow with patient  present to give verbal consent to communicate.    Call is regarding result note:    \"Please call patient and let her know that her thyroid was normal, but her A1c returned in the prediabetic range and should be rechecked in 6 months.        Your A1c has returned in the prediabetic range.  At this time it is going to be important for you to monitor your diet and decrease your carbohydrate intake, specifically limiting pastas, breads, rice, cereals, sugared beverages, desserts, etc.  This will be important to make these changes to help prevent progression to Diabetes.  Increasing exercise and weight loss can also help.  If you are interested in a nutrition referral, let me know and one can be placed for you.\"    Caitlin Fall RN   "

## 2023-08-09 NOTE — TELEPHONE ENCOUNTER
Daughter calls with patient present. Patient gives verbal permission to discuss medical information with daughter.    Notified Patient of provider's message.   Person was given an opportunity to ask questions, verbalized understanding of plan, and is agreeable.     Caitlin Fall RN

## 2023-11-20 ENCOUNTER — HOSPITAL ENCOUNTER (OUTPATIENT)
Dept: ULTRASOUND IMAGING | Facility: CLINIC | Age: 63
Discharge: HOME | End: 2023-11-20
Attending: PHYSICIAN ASSISTANT
Payer: COMMERCIAL

## 2023-11-20 ENCOUNTER — HOSPITAL ENCOUNTER (OUTPATIENT)
Dept: MAMMOGRAPHY | Facility: CLINIC | Age: 63
Discharge: HOME | End: 2023-11-20
Attending: PHYSICIAN ASSISTANT
Payer: COMMERCIAL

## 2023-11-20 DIAGNOSIS — R92.8 ABNORMAL MAMMOGRAM: ICD-10-CM

## 2023-11-20 PROCEDURE — 77062 BREAST TOMOSYNTHESIS BI: CPT

## 2023-11-20 PROCEDURE — 76642 ULTRASOUND BREAST LIMITED: CPT | Mod: RT

## 2023-12-18 ENCOUNTER — OFFICE VISIT (OUTPATIENT)
Dept: FAMILY MEDICINE | Facility: CLINIC | Age: 63
End: 2023-12-18
Payer: COMMERCIAL

## 2023-12-18 VITALS
HEIGHT: 60 IN | TEMPERATURE: 98 F | WEIGHT: 123.9 LBS | HEART RATE: 70 BPM | SYSTOLIC BLOOD PRESSURE: 130 MMHG | DIASTOLIC BLOOD PRESSURE: 60 MMHG | RESPIRATION RATE: 12 BRPM | OXYGEN SATURATION: 100 % | BODY MASS INDEX: 24.32 KG/M2

## 2023-12-18 DIAGNOSIS — Z12.4 CERVICAL CANCER SCREENING: ICD-10-CM

## 2023-12-18 DIAGNOSIS — Z23 NEED FOR COVID-19 VACCINE: ICD-10-CM

## 2023-12-18 DIAGNOSIS — Z00.00 ROUTINE GENERAL MEDICAL EXAMINATION AT A HEALTH CARE FACILITY: Primary | ICD-10-CM

## 2023-12-18 PROCEDURE — 90480 ADMN SARSCOV2 VAC 1/ONLY CMP: CPT | Performed by: FAMILY MEDICINE

## 2023-12-18 PROCEDURE — 91320 SARSCV2 VAC 30MCG TRS-SUC IM: CPT | Performed by: FAMILY MEDICINE

## 2023-12-18 PROCEDURE — 87624 HPV HI-RISK TYP POOLED RSLT: CPT | Performed by: FAMILY MEDICINE

## 2023-12-18 PROCEDURE — 99396 PREV VISIT EST AGE 40-64: CPT | Performed by: FAMILY MEDICINE

## 2023-12-18 PROCEDURE — G0145 SCR C/V CYTO,THINLAYER,RESCR: HCPCS | Performed by: FAMILY MEDICINE

## 2023-12-18 ASSESSMENT — PAIN SCALES - GENERAL: PAINLEVEL: NO PAIN (0)

## 2023-12-18 NOTE — PROGRESS NOTES
SUBJECTIVE:   Uyen is a 63 year old, presenting for the following:  Gyn Exam and Wellness Visit        12/18/2023     9:56 AM   Additional Questions   Accompanied by partner       History of Present Illness       Reason for visit:  Pap    She eats 2-3 servings of fruits and vegetables daily.She consumes 0 sweetened beverage(s) daily.She exercises with enough effort to increase her heart rate 9 or less minutes per day.  She exercises with enough effort to increase her heart rate 3 or less days per week.   She is not taking prescribed medications regularly due to None.  Healthy Habits:     Getting at least 3 servings of Calcium per day:  Yes    Bi-annual eye exam:  Yes    Dental care twice a year:  Yes    Sleep apnea or symptoms of sleep apnea:  None    Diet:  Regular (no restrictions)    Frequency of exercise:  1 day/week    Duration of exercise:  Less than 15 minutes    Taking medications regularly:  Yes    Barriers to taking medications:  None    Medication side effects:  None    Additional concerns today:  No      Here for annual visit.  Needs Pap.    Social History     Tobacco Use    Smoking status: Never    Smokeless tobacco: Never   Substance Use Topics    Alcohol use: Never           7/31/2023     9:39 AM   Alcohol Use   Prescreen: >3 drinks/day or >7 drinks/week? No          No data to display              Reviewed orders with patient.  Reviewed health maintenance and updated orders accordingly - Yes  Lab work is in process  Labs reviewed in EPIC  BP Readings from Last 3 Encounters:   12/18/23 130/60   07/31/23 122/65   04/18/23 (!) 142/78    Wt Readings from Last 3 Encounters:   12/18/23 56.2 kg (123 lb 14.4 oz)   07/31/23 56.9 kg (125 lb 6.4 oz)   04/18/23 55.8 kg (123 lb)                  Patient Active Problem List   Diagnosis    Benign essential hypertension    HSIL (high grade squamous intraepithelial lesion) on Pap smear of cervix    Hyperlipidemia LDL goal <130    S/P hysterectomy     Past Surgical  History:   Procedure Laterality Date    COLONOSCOPY N/A 1/16/2023    Procedure: COLONOSCOPY;  Surgeon: Trinidad Busby MD;  Location: [facility]    DAVINCI HYSTERECTOMY TOTAL, BILATERAL SALPINGO-OOPHORECTOMY, COMBINED N/A 12/22/2021    Procedure: Si Robotic Assisted TOTAL LAPAROSCOPIC HYSTERECTOMY, BILATERAL SALPINGO-OOPHORECTOMY;  Surgeon: Karen Sky MD;  Location: [facility]    THYROIDECTOMY          Social History     Tobacco Use    Smoking status: Never    Smokeless tobacco: Never   Substance Use Topics    Alcohol use: Never     Family History   Problem Relation Age of Onset    Colon Cancer No family hx of          Current Outpatient Medications   Medication Sig Dispense Refill    lisinopril (ZESTRIL) 20 MG tablet Take 1 tablet (20 mg) by mouth daily 90 tablet 3    simvastatin (ZOCOR) 10 MG tablet Take 1 tablet (10 mg) by mouth At Bedtime 90 tablet 3     No Known Allergies  Recent Labs   Lab Test 07/31/23  1023 05/04/23  0833 04/18/23  1027 03/28/22  1044 11/08/21  0810 09/17/21  0948   A1C 5.8*  --   --   --   --   --    LDL  --   --  71 67  --  141*   HDL  --   --  42* 43*  --  39*   TRIG  --   --  148 95  --  127   ALT  --   --  21 24  --  27   CR  --   --  0.80 0.64   < > 0.67   GFRESTIMATED  --   --  82 >90   < > >90   POTASSIUM  --  4.6 5.4* 3.4   < > 4.1   TSH 2.64  --   --   --   --  2.32    < > = values in this interval not displayed.        Breast Cancer Screening:    FHS-7:       10/31/2022     9:16 AM 11/20/2023     7:59 AM   Breast CA Risk Assessment (FHS-7)   Did any of your first-degree relatives have breast or ovarian cancer? No No   Did any of your relatives have bilateral breast cancer? No No   Did any man in your family have breast cancer? No No   Did any woman in your family have breast and ovarian cancer? No No   Did any woman in your family have breast cancer before age 50 y? No No   Do you have 2 or more relatives with breast and/or ovarian cancer? No No   Do you have 2 or more relatives  with breast and/or bowel cancer? No No       Mammogram Screening: Recommended mammography every 1-2 years with patient discussion and risk factor consideration  Pertinent mammograms are reviewed under the imaging tab.    History of abnormal Pap smear: NO - age 30-65 PAP every 5 years with negative HPV co-testing recommended      Latest Ref Rng & Units 12/12/2022    10:45 AM 9/17/2021     9:36 AM   PAP / HPV   PAP  Negative for Intraepithelial Lesion or Malignancy (NILM)  High-grade squamous intraepithelial lesion (HSIL) with features suspicious for invasion    HPV 16 DNA Negative Negative  Positive    HPV 18 DNA Negative Negative  Negative    Other HR HPV Negative Negative  Positive      Reviewed and updated as needed this visit by clinical staff   Tobacco  Allergies  Meds              Reviewed and updated as needed this visit by Provider                 Past Medical History:   Diagnosis Date    Abnormal Pap smear of cervix 09/17/2021    Cervical high risk HPV (human papillomavirus) test positive 09/17/2021    Hypertension     Thyroid disease       Past Surgical History:   Procedure Laterality Date    COLONOSCOPY N/A 1/16/2023    Procedure: COLONOSCOPY;  Surgeon: Trinidad Busby MD;  Location: [facility]    DAVINCI HYSTERECTOMY TOTAL, BILATERAL SALPINGO-OOPHORECTOMY, COMBINED N/A 12/22/2021    Procedure: Si Robotic Assisted TOTAL LAPAROSCOPIC HYSTERECTOMY, BILATERAL SALPINGO-OOPHORECTOMY;  Surgeon: Karen Sky MD;  Location: [facility]    THYROIDECTOMY          Review of Systems  CONSTITUTIONAL: NEGATIVE for fever, chills, change in weight  INTEGUMENTARY/SKIN: NEGATIVE for worrisome rashes, moles or lesions  EYES: NEGATIVE for vision changes or irritation  ENT: NEGATIVE for ear, mouth and throat problems  RESP: NEGATIVE for significant cough or SOB  BREAST: NEGATIVE for masses, tenderness or discharge  CV: NEGATIVE for chest pain, palpitations or peripheral edema  GI: NEGATIVE for nausea, abdominal pain,  heartburn, or change in bowel habits  : NEGATIVE for unusual urinary or vaginal symptoms. No vaginal bleeding.  MUSCULOSKELETAL: NEGATIVE for significant arthralgias or myalgia  NEURO: NEGATIVE for weakness, dizziness or paresthesias  PSYCHIATRIC: NEGATIVE for changes in mood or affect      OBJECTIVE:   /60 (BP Location: Right arm, Patient Position: Sitting, Cuff Size: Adult Regular)   Pulse 70   Temp 98  F (36.7  C) (Oral)   Resp 12   Ht 1.524 m (5')   Wt 56.2 kg (123 lb 14.4 oz)   SpO2 100%   BMI 24.20 kg/m    Physical Exam  GENERAL APPEARANCE: healthy, alert and no distress  EYES: Eyes grossly normal to inspection, PERRL and conjunctivae and sclerae normal  HENT: ear canals and TM's normal, nose and mouth without ulcers or lesions, oropharynx clear and oral mucous membranes moist  NECK: no adenopathy, no asymmetry, masses, or scars and thyroid normal to palpation  RESP: lungs clear to auscultation - no rales, rhonchi or wheezes  CV: regular rate and rhythm, normal S1 S2, no S3 or S4, no murmur, click or rub, no peripheral edema and peripheral pulses strong  ABDOMEN: soft, nontender, no hepatosplenomegaly, no masses and bowel sounds normal   (female): normal female external genitalia, normal urethral meatus, vaginal mucosal atrophy noted, and normal post-hysterectomy exam without masses.   MS: no musculoskeletal defects are noted and gait is age appropriate without ataxia  SKIN: no suspicious lesions or rashes  NEURO: Normal strength and tone, sensory exam grossly normal, mentation intact and speech normal  PSYCH: mentation appears normal and affect normal/bright    Diagnostic Test Results:  Labs reviewed in Epic    ASSESSMENT/PLAN:       ICD-10-CM    1. Routine general medical examination at a health care facility  Z00.00       2. Cervical cancer screening  Z12.4 Pap screen with HPV - recommended age 30 - 65 years      3. Need for COVID-19 vaccine  Z23 COVID-19 12+ (2023-24) (PFIZER)           Patient has been advised of split billing requirements and indicates understanding: Yes      COUNSELING:  Reviewed preventive health counseling, as reflected in patient instructions        She reports that she has never smoked. She has never used smokeless tobacco.        Berta Moy MD  [facility]

## 2023-12-18 NOTE — PROGRESS NOTES
"SUBJECTIVE:   Uyen is a 63 year old, presenting for the following:  Gyn Exam and Wellness Visit        12/18/2023     9:56 AM   Additional Questions   Accompanied by partner       Are you in the first 12 months of your Medicare coverage?  No    History of Present Illness       Reason for visit:  Pap    She eats 2-3 servings of fruits and vegetables daily.She consumes 0 sweetened beverage(s) daily.She exercises with enough effort to increase her heart rate 9 or less minutes per day.  She exercises with enough effort to increase her heart rate 3 or less days per week.   She is taking medications regularly.          Have you ever done Advance Care Planning? (For example, a Health Directive, POLST, or a discussion with a medical provider or your loved ones about your wishes): No, advance care planning information given to patient to review.  Patient plans to discuss their wishes with loved ones or provider.         Fall risk     Cognitive Screening   NORMAL  3) 3 item recall: {Say: \"What were the three words I asked you to remember?\"}Recalls 2 objects   Results: NORMAL clock, 1-2 items recalled: COGNITIVE IMPAIRMENT LESS LIKELY    Mini-CogTM Copyright S Norah. Licensed by the author for use in [facility]; reprinted with permission (frederic@[facility]). All rights reserved.      Do you have sleep apnea, excessive snoring or daytime drowsiness? : no    Reviewed and updated as needed this visit by clinical staff   Tobacco  Allergies  Meds              Reviewed and updated as needed this visit by Provider                 Social History     Tobacco Use    Smoking status: Never    Smokeless tobacco: Never   Substance Use Topics    Alcohol use: Never     {Rooming staff  Click this link to complete the Prescreen if response below is not for today's visit  Alcohol Use Prescreen >3 drinks/day or > 7 drinks/week.  If the prescreen question answer is YES, complete the full AUDIT  :116277}        7/31/2023     9:39 AM "   Alcohol Use   Prescreen: >3 drinks/day or >7 drinks/week? No   {add AUDIT responses (Optional) (A score of 7 for adult men is an indication of hazardous drinking; a score of 8 or more is an indication of an alcohol use disorder.  A score of 7 or more for adult women is an indication of hazardous drinking or an alchohol use disorder):308150}  Do you have a current opioid prescription? No  Do you use any other controlled substances or medications that are not prescribed by a provider? None  {Provider  If there are gaps in the social history shown above, please follow the link and refresh the note Link to Social and Substance History :683492}    {Outside tests to abstract? (Optional):074954}    {additional problems to add (Optional):685054}    Current providers sharing in care for this patient include: {Rooming staff:  Please update Care Team from storyboard, refresh this note and then delete this statement}  Patient Care Team:  Clinic - [facility] as PCP - Karen Yeh MD as Assigned OBGYN Provider  Coming Berta Moy MD as Assigned PCP    The following health maintenance items are reviewed in Epic and correct as of today:  Health Maintenance   Topic Date Due    RSV VACCINE (Pregnancy & 60+) (1 - 1-dose 60+ series) Never done    ANNUAL REVIEW OF HM ORDERS  12/20/2022    COVID-19 Vaccine (6 - 2023-24 season) 09/01/2023    PAP FOLLOW-UP  12/12/2023    HPV FOLLOW-UP  12/12/2023    YEARLY PREVENTIVE VISIT  07/31/2024    MAMMO SCREENING  11/20/2025    COLORECTAL CANCER SCREENING  01/16/2028    LIPID  04/18/2028    ADVANCE CARE PLANNING  08/07/2028    DTAP/TDAP/TD IMMUNIZATION (2 - Td or Tdap) 04/09/2031    HEPATITIS C SCREENING  Completed    HIV SCREENING  Completed    PHQ-2 (once per calendar year)  Completed    INFLUENZA VACCINE  Completed    ZOSTER IMMUNIZATION  Completed    Pneumococcal Vaccine: Pediatrics (0 to 5 Years) and At-Risk Patients (6 to 64 Years)  Aged Out     IPV IMMUNIZATION  Aged Out    HPV IMMUNIZATION  Aged Out    MENINGITIS IMMUNIZATION  Aged Out    RSV MONOCLONAL ANTIBODY  Aged Out    PAP  Discontinued     {Chronicprobdata (optional):332144}  {Decision Support (Optional):971333}    FHS-7:       10/31/2022     9:16 AM 2023     7:59 AM   Breast CA Risk Assessment (FHS-7)   Did any of your first-degree relatives have breast or ovarian cancer? No No   Did any of your relatives have bilateral breast cancer? No No   Did any man in your family have breast cancer? No No   Did any woman in your family have breast and ovarian cancer? No No   Did any woman in your family have breast cancer before age 50 y? No No   Do you have 2 or more relatives with breast and/or ovarian cancer? No No   Do you have 2 or more relatives with breast and/or bowel cancer? No No     {If any of the questions to the BCRA (FHS-7) are answered yes, consider ordering referral for genetic counseling (Optional) :982383}  {AMB Mammogram Decision Support (Optional) :644253}  Pertinent mammograms are reviewed under the imaging tab.    Review of Systems  {ROS COMP (Optional):806318}    OBJECTIVE:   /60 (BP Location: Right arm, Patient Position: Sitting, Cuff Size: Adult Regular)   Pulse 70   Temp 98  F (36.7  C) (Oral)   Resp 12   Ht 1.524 m (5')   Wt 56.2 kg (123 lb 14.4 oz)   SpO2 100%   BMI 24.20 kg/m   Estimated body mass index is 24.2 kg/m  as calculated from the following:    Height as of this encounter: 1.524 m (5').    Weight as of this encounter: 56.2 kg (123 lb 14.4 oz).  Physical Exam  {Exam (Optional) :135637}    {Diagnostic Test Results (Optional):530686}    ASSESSMENT / PLAN:   {Diag Picklist:810584}    {Patient advised of split billing (Optional):234002}      COUNSELING:  {Medicare Counselin}        She reports that she has never smoked. She has never used smokeless tobacco.      Appropriate preventive services were discussed with this patient, including applicable  screening as appropriate for fall prevention, nutrition, physical activity, Tobacco-use cessation, weight loss and cognition.  Checklist reviewing preventive services available has been given to the patient.    Reviewed patients plan of care and provided an AVS. The {CarePlan:207031} for Thi meets the Care Plan requirement. This Care Plan has been established and reviewed with the {PATIENT, FAMILY MEMBER, CAREGIVER:688166}.    {Counseling Resources  [place] Preventive Services Task Force  Cholesterol Screening  Health diet/nutrition  Pooled Cohorts Equation Calculator  [employer]'s MyPlate  ASA Prophylaxis  Lung CA Screening  Osteoporosis prevention/bone health :339435}  {Breast Cancer Risk Calculator  BRCA-Related Cancer Risk Assessment FHS-7 Tool :005821}    Berta Moy MD  [facility]    Identified Health Risks:  {Medicare required documentation of substance and opioid use disorders screening :796734}

## 2023-12-21 LAB
HUMAN PAPILLOMA VIRUS 16 DNA: NEGATIVE
HUMAN PAPILLOMA VIRUS 18 DNA: NEGATIVE
HUMAN PAPILLOMA VIRUS FINAL DIAGNOSIS: ABNORMAL
HUMAN PAPILLOMA VIRUS OTHER HR: POSITIVE

## 2023-12-27 DIAGNOSIS — Z12.4 CERVICAL CANCER SCREENING: Primary | ICD-10-CM

## 2023-12-27 DIAGNOSIS — R87.811 VAGINAL HIGH RISK HPV DNA TEST POSITIVE: ICD-10-CM

## 2023-12-27 DIAGNOSIS — D06.9 CIN III WITH SEVERE DYSPLASIA: ICD-10-CM

## 2023-12-27 DIAGNOSIS — Z90.710 S/P HYSTERECTOMY: ICD-10-CM

## 2023-12-28 ENCOUNTER — PATIENT OUTREACH (OUTPATIENT)
Dept: FAMILY MEDICINE | Facility: CLINIC | Age: 63
End: 2023-12-28
Payer: COMMERCIAL

## 2023-12-28 ENCOUNTER — PATIENT OUTREACH (OUTPATIENT)
Dept: ONCOLOGY | Facility: CLINIC | Age: 63
End: 2023-12-28
Payer: COMMERCIAL

## 2023-12-28 NOTE — TELEPHONE ENCOUNTER
RECORDS STATUS - ALL OTHER DIAGNOSIS      RECORDS RECEIVED FROM: [facility] - Internal Records   DATE RECEIVED: 12/28

## 2023-12-28 NOTE — PROGRESS NOTES
"New Patient Hematology / Oncology Nurse Navigator Note     Referral Date: 12/27/23    Referring provider:   Berta Miranda MD   [address] [facility] [address]   Phone: [phone]   Fax: [phone]       Referring Clinic/Organization: [facility]     Referred to: GynOnc (will recommend patient establish with [facility])    Requested provider (if applicable): First available - did not specify Will recommend Dr. [facility].    Evaluation for :   ARMANDO III with severe dysplasia   S/P hysterectomy   Vaginal high risk HPV DNA test positiv        Clinical History (per Nurse review of records provided):    12/18/23 Office Visit with FP -- BOOKMARKED  12/18/23 PAP (NILM)/HPV:    Component  Ref Range & Units      Other HR HPV  Negative Positive Abnormal     HPV16 DNA  Negative Negative    HPV18 DNA  Negative Negative    FINAL DIAGNOSIS    This patient's sample is positive for other HR HPV DNA (types 31, 33, 35, 39, 45, 51, 52, 56, 58, 59, 66 or 68), not HPV 16 or HPV 18 DNA. This result requires clinical correlation with concurrent cytology findings.   12/12/22 Office Visit with OBGYN:   \"9/17/21 HSIL, +HPV 16 & other HR type. Plan [facility] bef 12/17/21 9/24/21 Left message with help from interpretor to call back  9/28/21 Visit with PCP: Pap/HPV results discussed with patient / result letter sent with [facility] instructions  10/19/21 [facility]: Cervical Bx's x 4 = ARMANDO 3. ECC = ARMANDO 3.  Plan LEEP  12/22/21 Hysterectomy pathology = ARMANDO 2/3, endocervical gland involvement. No cancer. Plan paps until age 86. Co-test due 12/22/22 12/12/22 appt\"-- BOOKMARKED  12/22/21 Path from total laparoscopic hysterectomy and bilateral salpingo-oophorectomy  -- BOOKMARKED    Clinical Assessment / Barriers to Care (Per Nurse):  Pt lives in [place], [place]. Requires [place] .     Records Location: [facility]     Records Needed:   N/A    Additional testing needed prior to consult: "   N/A    Referral updates and Plan:   OUTGOING CALL to pt with Greek :  Introduced my role as nurse navigator with [facility] Hematology/Oncology dept and that we have recd the referral to GynOnc from Dr Mamta Moy.  Pt confirms they are aware of the referral and ready to schedule  Provided contact information if future questions arise.     -Transferred pt to NPS line [phone] to schedule appt per scheduling instructions.    Jazzmine Jackson, BSN, RN, PHN, OCN  Hematology/Oncology Nurse Navigator  [facility]  [phone]

## 2023-12-28 NOTE — TELEPHONE ENCOUNTER
12/18/23 NIL vaginal pap, + HR HPV (neg 16/18). Plan dysplasia clinic referral.  1/4/24 appt with dysplasia clinic

## 2024-01-03 PROBLEM — D06.9 CIN III WITH SEVERE DYSPLASIA: Status: ACTIVE | Noted: 2021-09-17

## 2024-01-04 ENCOUNTER — PRE VISIT (OUTPATIENT)
Dept: ONCOLOGY | Facility: CLINIC | Age: 64
End: 2024-01-04
Payer: COMMERCIAL

## 2024-02-21 NOTE — TELEPHONE ENCOUNTER
RECORDS STATUS - ALL OTHER DIAGNOSIS      RECORDS RECEIVED FROM: [facility] - Internal records   DATE RECEIVED: 2/21

## 2024-02-28 NOTE — PATIENT INSTRUCTIONS
SCHEDULING:  -RTC in 1 year for repeat vaginal cytology & HPV co-test (MD, in-person, HPV clinic with [place] ).  --order(s) placed       DIAGNOSIS:  History of cervical HSIL (CIN3, pre-cancer), currently with non-16/18 hrHPV+ vaginal cancer screening.  Treatment History:  -11/10/21: LEEP (removal of a piece of the cervix).   -12/22/21: Robotic-assisted total laparoscopic hysterectomy (removal of uterus/cervix), bilateral salpingo-oophorectomy (removal of bilateral ovaries & fallopian tubes).     PLAN:  1) hrHPV+ vaginal cancer screening: While HPV is the primary risk factor for vaginal cancer, the overall risk of vaginal cancer is very low. Therefore, surveillance is recommended with treatment only in the setting of pre-cancerous changes.     Plan as follows:  -Return in 1 year for vaginal cytology & HPV co-test, with consideration of concomitant vaginoscopy given increased risk of persistent hrHPV.   -Please call in the interim if you have vaginal bleeding, abnormal discharge, or other concerning symptoms.      Please call with any questions or concerns. [phone] (after-hours ask for gyn onc)       Padma Lubin MD, MS, FACOG, FACS  2/29/2024  1:00 PM

## 2024-02-28 NOTE — PROGRESS NOTES
Consult Note  [facility] Gynecologic Oncology HPV Clinic  [facility]      Referring provider/Family Medicine:    April Courtney Moy MD  [address] [place] [address]      Primary Care Provider:  [facility]  [id number] [place] [id number]         Patient: Uyen Rodney  : 1960  Language: Mongolian   Pronouns: she/her/hers     Date of Visit: 2024       Reason for visit: non-16/18 hrHPV+ vaginal cancer screening.       History of Present Illness:  Uyen Rodney is a 63 year old patient referred to the [facility] Gynecologic Oncology HPV Clinic for evaluation of non-16/18 hrHPV+ vaginal cancer screening. History as follows:    *HPV vaccination status: Unvaccinated.    Cervical/Vaginal Cancer Screening History:  21: Pap test HSIL, hrHPV16+,non-16/18 hrHPV+.   10/19/21: Cervical biopsy pathology HSIL (CIN3).   11/10/21: LEEP.   -Pathology: Cervical HSIL (CIN3), negative endocervical curretings.   21: Robotic-assisted total laparoscopic hysterectomy, bilateral salpingo-oophorectomy.   -Pathology: Cervical HSIL (CIN2-3) involving all 4 quadrants with extensive involvement of endocervical glands.     22: Vaginal cytology NILM, hrHPV-.     23: Vaginal cytology NILM, non-16/18 hrHPV+.        Subjective:  Uyen Rodney presents to the Gyn Onc HPV Clinic today for a scheduled consultation, accompanied by her . No symptoms. No abnormal bleeding. No abnormal discharge. No persistent vulvar itching or lesions noted. She has not had any vaginal bleeding since her hysterectomy.    Medical History:  Past Medical History:   Diagnosis Date    ARMANDO III (cervical intraepithelial neoplasia III) 2021    Hypertension     Thyroid disease          Surgical History:  Past Surgical History:   Procedure Laterality Date    COLONOSCOPY N/A 2023    Procedure: COLONOSCOPY;  Surgeon: Trinidad Busby MD;  Location: [facility]     DAVINCI HYSTERECTOMY TOTAL, BILATERAL SALPINGO-OOPHORECTOMY, COMBINED N/A 12/22/2021    Procedure: Si Robotic Assisted TOTAL LAPAROSCOPIC HYSTERECTOMY, BILATERAL SALPINGO-OOPHORECTOMY;  Surgeon: Karen Sky MD;  Location: [facility] OR    LEEP TX, CERVICAL  11/10/2021    For cervical HSIL (CIN3)    THYROIDECTOMY             Gynecologic History:  Menstrual/Menopause status: Menopausal. S/p hysterectomy.   Hormone therapy/Contraception status: No  History of STIS: No  Current sexual activity status: Rarely sexually active    Medications:   Current Outpatient Medications   Medication    lisinopril (ZESTRIL) 20 MG tablet    simvastatin (ZOCOR) 10 MG tablet     No current facility-administered medications for this visit.          Allergies:   No Known Allergies       Social History:  Patient lives with  and daughter  Work status: Works for [employer]. Activity: able to walk up and down stairs without SOB  Advanced Directives: No. Desired Healthcare Power of :   Social History     Tobacco Use    Smoking status: Never    Smokeless tobacco: Never   Vaping Use    Vaping Use: Never used   Substance Use Topics    Alcohol use: Never    Drug use: Never          Family History:  Family history significant for none  No known family history of breast, ovarian, uterine, colon, urothelial/renal, prostate or pancreatic cancers.  No known family history of melanoma.  Family History   Problem Relation Age of Onset    Colon Cancer No family hx of        Physical Exam:   /82 (Cuff Size: Adult Regular)   Pulse 78   Temp 97  F (36.1  C) (Tympanic)   Resp 16   Ht 1.524 m (5')   Wt 59 kg (130 lb)   SpO2 99%   BMI 25.39 kg/m    Body mass index is 25.39 kg/m .     General Appearance: healthy and alert, no distress     Musculoskeletal: extremities non tender and without edema    Skin: no lesions or rashes     Genitourinary: External genitalia and urethral meatus appears normal.  Vagina is smooth without  nodularity or masses.  Cervix absent.  Verbal consent provided by the patient prior to pelvic exam.  Each step of the exam was verbalized throughout.  Present for the exam: Maribel Holloway MD (PGY4)--Exam/procedure performed by Dr. Holloway under my direct supervision.   Patient's  also present for exam/procedure.      Procedure Risk Statement:   The patient was counseled regarding risks, benefits and alternatives to vaginoscopy, possible biopsies.  We discussed ASCCP-recommended management, which is repeat vaginal cytology & HPV co-test in 1 year, with vaginoscopy only if hrHPV remains positive, vs proceeding with vaginoscopy today. Risks discussed include but not limited to:  Bleeding; infection; injury to adjacent organs; inadequate sample or false negative result.  The patient's questions were answered, and informed consent signed.       Procedure:   Vaginoscopy:  After the informed consent was signed and time-out procedure was performed, dilute acetic acid was applied to the vagina, and the vagina was visualized under colposcopic enhancement.   -Acetowhite changes? None  -Lugol's non-staining areas? NA  -Mosaicism, punctations, other lesions? None  -Clinical impression: Normal/atrophic  -Specimens: None         Laboratory Examination:  7/31/23 Hemoglobin A1C: 5.8%  I have independently reviewed & interpreted the cervical/vaginal cancer screening & pathology results detailed in the HPI.       Performance Status:  ECOG Grade 0.       Assessment:  Uyen Rodney is a 63 year old patient with a history of cervical HSIL s/p hysterectomy, currently with non-16/18 hrHPV+ vaginal cancer screening, vaginoscopic impression normal/atrophic.       Plan:   1) hrHPV+ vaginal cancer screening: I reviewed the HPV test results and vaginoscopy findings with the patient. Discussed that while persistent hrHPV is the primary risk factor for vaginal cancer, the overall risk of vaginal cancer, especially in the setting of non-16  hrHPV, is very low. Therefore, recommend continued surveillance but no treatment unless there is a diagnosis of histologic HSIL.     Plan as follows:  -RTC in 1 year for repeat vaginal cytology & hrHPV co-test, with consideration of concomitant vaginoscopy given increased risk of persistent hrHPV (history of extensive cervical HSIL suggestive of long-standing HPV).    -If no evidence of histologic HSIL for at least 2 years, will discuss option of increasing the surveillance interval to every 2-3 years, even if hrHPV remains positive per ASCCP recommendations (Phelps et al. A common clinical dilemma: Management of abnormal vaginal cytology and human papillomavirus test results. Gynecologic Oncology 2016;141:364-70).   -The patient will call in the interim if there is any abnormal vaginal discharge, vaginal bleeding, or other concerning symptoms.     2) Genetic risk factors assessed: Does not meet criteria for Genetics referral.     3) Labs/tests ordered: None.    4) Health maintenance issues addressed today: None.    5) Code status: Full-code.     6) Prescriptions: None.      Entire visit conducted with the aid of a LanguageLine Paraguayan .       A total of 20 minutes was spent with the patient, 5 minutes of which were spent in counseling/treatment planning, 12 minutes of which were spent in procedure time; an additional 5 minutes was spent in chart review (including review of labs) and documentation.             Padma Lubin MD, MS, FACOG, FACS  2/29/2024  1:11 PM

## 2024-02-29 ENCOUNTER — PRE VISIT (OUTPATIENT)
Dept: ONCOLOGY | Facility: CLINIC | Age: 64
End: 2024-02-29
Payer: COMMERCIAL

## 2024-02-29 ENCOUNTER — OFFICE VISIT (OUTPATIENT)
Dept: ONCOLOGY | Facility: CLINIC | Age: 64
End: 2024-02-29
Attending: FAMILY MEDICINE
Payer: COMMERCIAL

## 2024-02-29 VITALS
HEART RATE: 78 BPM | DIASTOLIC BLOOD PRESSURE: 82 MMHG | OXYGEN SATURATION: 99 % | WEIGHT: 130 LBS | SYSTOLIC BLOOD PRESSURE: 122 MMHG | HEIGHT: 60 IN | TEMPERATURE: 97 F | BODY MASS INDEX: 25.52 KG/M2 | RESPIRATION RATE: 16 BRPM

## 2024-02-29 DIAGNOSIS — R87.811 VAGINAL HIGH RISK HPV DNA TEST POSITIVE: Primary | ICD-10-CM

## 2024-02-29 DIAGNOSIS — D06.9 CIN III WITH SEVERE DYSPLASIA: ICD-10-CM

## 2024-02-29 DIAGNOSIS — Z90.710 S/P HYSTERECTOMY: ICD-10-CM

## 2024-02-29 PROCEDURE — G0463 HOSPITAL OUTPT CLINIC VISIT: HCPCS | Mod: 25 | Performed by: OBSTETRICS & GYNECOLOGY

## 2024-02-29 PROCEDURE — 57420 EXAM OF VAGINA W/SCOPE: CPT | Performed by: OBSTETRICS & GYNECOLOGY

## 2024-02-29 PROCEDURE — 99204 OFFICE O/P NEW MOD 45 MIN: CPT | Mod: 25 | Performed by: OBSTETRICS & GYNECOLOGY

## 2024-02-29 PROCEDURE — 99213 OFFICE O/P EST LOW 20 MIN: CPT | Mod: 25 | Performed by: OBSTETRICS & GYNECOLOGY

## 2024-02-29 NOTE — LETTER
2024         RE: Uyen Rodney  [address] [address] [facility] [address]        Dear Colleague,    Thank you for referring your patient, Uyen Rodney, to the [facility]. Please see a copy of my visit note below.    Consult Note  [facility] Gynecologic Oncology HPV Clinic  [facility]      Referring provider/Family Medicine:    April Courtney Moy MD  [address] [place] [address]      Primary Care Provider:  [facility]  [id number] [place] [id number]         Patient: Uyen Rodney  : 1960  Language: Kyrgyz   Pronouns: she/her/hers     Date of Visit: 2024       Reason for visit: non-16/18 hrHPV+ vaginal cancer screening.       History of Present Illness:  Uyen Rodney is a 63 year old patient referred to the [facility] Gynecologic Oncology HPV Clinic for evaluation of non-16/18 hrHPV+ vaginal cancer screening. History as follows:    *HPV vaccination status: Unvaccinated.    Cervical/Vaginal Cancer Screening History:  21: Pap test HSIL, hrHPV16+,non-16/18 hrHPV+.   10/19/21: Cervical biopsy pathology HSIL (CIN3).   11/10/21: LEEP.   -Pathology: Cervical HSIL (CIN3), negative endocervical curretings.   21: Robotic-assisted total laparoscopic hysterectomy, bilateral salpingo-oophorectomy.   -Pathology: Cervical HSIL (CIN2-3) involving all 4 quadrants with extensive involvement of endocervical glands.     22: Vaginal cytology NILM, hrHPV-.     23: Vaginal cytology NILM, non-16/18 hrHPV+.        Subjective:  Uyen Rodney presents to the Gyn Onc HPV Clinic today for a scheduled consultation, accompanied by her . No symptoms. No abnormal bleeding. No abnormal discharge. No persistent vulvar itching or lesions noted. She has not had any vaginal bleeding since her hysterectomy.    Medical History:  Past Medical History:   Diagnosis Date     ARMANDO III (cervical  intraepithelial neoplasia III) 09/17/2021     Hypertension      Thyroid disease          Surgical History:  Past Surgical History:   Procedure Laterality Date     COLONOSCOPY N/A 01/16/2023    Procedure: COLONOSCOPY;  Surgeon: Trinidad Busby MD;  Location:  [facility]     DAVINCI HYSTERECTOMY TOTAL, BILATERAL SALPINGO-OOPHORECTOMY, COMBINED N/A 12/22/2021    Procedure: Si Robotic Assisted TOTAL LAPAROSCOPIC HYSTERECTOMY, BILATERAL SALPINGO-OOPHORECTOMY;  Surgeon: Karen Sky MD;  Location:  OR     LEEP TX, CERVICAL  11/10/2021    For cervical HSIL (CIN3)     THYROIDECTOMY             Gynecologic History:  Menstrual/Menopause status: Menopausal. S/p hysterectomy.   Hormone therapy/Contraception status: No  History of STIS: No  Current sexual activity status: Rarely sexually active    Medications:   Current Outpatient Medications   Medication     lisinopril (ZESTRIL) 20 MG tablet     simvastatin (ZOCOR) 10 MG tablet     No current facility-administered medications for this visit.          Allergies:   No Known Allergies       Social History:  Patient lives with  and daughter  Work status: Works for [employer]. Activity: able to walk up and down stairs without SOB  Advanced Directives: No. Desired Healthcare Power of :   Social History     Tobacco Use     Smoking status: Never     Smokeless tobacco: Never   Vaping Use     Vaping Use: Never used   Substance Use Topics     Alcohol use: Never     Drug use: Never          Family History:  Family history significant for none  No known family history of breast, ovarian, uterine, colon, urothelial/renal, prostate or pancreatic cancers.  No known family history of melanoma.  Family History   Problem Relation Age of Onset     Colon Cancer No family hx of        Physical Exam:   /82 (Cuff Size: Adult Regular)   Pulse 78   Temp 97  F (36.1  C) (Tympanic)   Resp 16   Ht 1.524 m (5')   Wt 59 kg (130 lb)   SpO2 99%   BMI 25.39 kg/m     Body mass index is 25.39 kg/m .     General Appearance: healthy and alert, no distress     Musculoskeletal: extremities non tender and without edema    Skin: no lesions or rashes     Genitourinary: External genitalia and urethral meatus appears normal.  Vagina is smooth without nodularity or masses.  Cervix absent.  Verbal consent provided by the patient prior to pelvic exam.  Each step of the exam was verbalized throughout.  Present for the exam: Maribel Holloway MD (PGY4)--Exam/procedure performed by Dr. Holloway under my direct supervision.       Procedure Risk Statement:   The patient was counseled regarding risks, benefits and alternatives to vaginoscopy, possible biopsies.  We discussed ASCCP-recommended management, which is repeat vaginal cytology & HPV co-test in 1 year, with vaginoscopy only if hrHPV remains positive, vs proceeding with vaginoscopy today. Risks discussed include but not limited to:  Bleeding; infection; injury to adjacent organs; inadequate sample or false negative result.  The patient's questions were answered, and informed consent signed.       Procedure:   Vaginoscopy:  After the informed consent was signed and time-out procedure was performed, dilute acetic acid was applied to the vagina, and the vagina was visualized under colposcopic enhancement.   -Acetowhite changes? None  -Lugol's non-staining areas? NA  -Mosaicism, punctations, other lesions? None  -Clinical impression: Normal/atrophic  -Specimens: None         Laboratory Examination:  7/31/23 Hemoglobin A1C: 5.8%  I have independently reviewed & interpreted the cervical/vaginal cancer screening & pathology results detailed in the HPI.       Performance Status:  ECOG Grade 0.       Assessment:  Uyen Rodney is a 63 year old patient with a history of cervical HSIL s/p hysterectomy, currently with non-16/18 hrHPV+ vaginal cancer screening, vaginoscopic impression normal/atrophic.       Plan:   1) hrHPV+ vaginal cancer screening: I  reviewed the HPV test results and vaginoscopy findings with the patient. Discussed that while persistent hrHPV is the primary risk factor for vaginal cancer, the overall risk of vaginal cancer, especially in the setting of non-16 hrHPV, is very low. Therefore, recommend continued surveillance but no treatment unless there is a diagnosis of histologic HSIL.     Plan as follows:  -RTC in 1 year for repeat vaginal cytology & hrHPV co-test, with consideration of concomitant vaginoscopy given increased risk of persistent hrHPV (history of extensive cervical HSIL suggestive of long-standing HPV).    -If no evidence of histologic HSIL for at least 2 years, will discuss option of increasing the surveillance interval to every 2-3 years, even if hrHPV remains positive per ASCCP recommendations (Phelps et al. A common clinical dilemma: Management of abnormal vaginal cytology and human papillomavirus test results. Gynecologic Oncology 2016;141:364-70).   -The patient will call in the interim if there is any abnormal vaginal discharge, vaginal bleeding, or other concerning symptoms.     2) Genetic risk factors assessed: Does not meet criteria for Genetics referral.     3) Labs/tests ordered: None.    4) Health maintenance issues addressed today: None.    5) Code status: Full-code.     6) Prescriptions: None.      Entire visit conducted with the aid of a LanguageLine Belarusian .       A total of 20 minutes was spent with the patient, 5 minutes of which were spent in counseling/treatment planning, 12 minutes of which were spent in procedure time; an additional 5 minutes was spent in chart review (including review of labs) and documentation.             Padma Lubin MD, MS, FACOG, FACS  2/29/2024  1:11 PM            Again, thank you for allowing me to participate in the care of your patient.        Sincerely,        Padma Lubin MD

## 2024-02-29 NOTE — NURSING NOTE
Oncology Rooming Note    February 29, 2024 12:21 PM   Uyen Rodney is a 63 year old female who presents for:    Chief Complaint   Patient presents with    Oncology Clinic Visit     Initial Vitals: /82 (Cuff Size: Adult Regular)   Pulse 78   Temp 97  F (36.1  C) (Tympanic)   Resp 16   Ht 1.524 m (5')   Wt 59 kg (130 lb)   SpO2 99%   BMI 25.39 kg/m   Estimated body mass index is 25.39 kg/m  as calculated from the following:    Height as of this encounter: 1.524 m (5').    Weight as of this encounter: 59 kg (130 lb). Body surface area is 1.58 meters squared.  No Pain (0) Comment: Data Unavailable   No LMP recorded. Patient has had a hysterectomy.  Allergies reviewed: Yes  Medications reviewed: Yes    Medications: Medication refills not needed today.  Pharmacy name entered into [email or web address]: [facility] [place], [place] - [address]    Frailty Screening:   Is the patient here for a new oncology consult visit in cancer care? 2. No      Clinical concerns: new patient- dysplasia clinic      Linda Baldwin CMA

## 2024-04-24 DIAGNOSIS — I10 BENIGN ESSENTIAL HYPERTENSION: ICD-10-CM

## 2024-04-24 DIAGNOSIS — E78.5 HYPERLIPIDEMIA LDL GOAL <130: ICD-10-CM

## 2024-04-24 RX ORDER — SIMVASTATIN 10 MG
10 TABLET ORAL AT BEDTIME
Qty: 90 TABLET | Refills: 2 | Status: SHIPPED | OUTPATIENT
Start: 2024-04-24 | End: 2024-06-04

## 2024-04-24 RX ORDER — LISINOPRIL 20 MG/1
20 TABLET ORAL DAILY
Qty: 90 TABLET | Refills: 2 | Status: SHIPPED | OUTPATIENT
Start: 2024-04-24 | End: 2024-06-04

## 2024-05-20 DIAGNOSIS — R92.8 ABNORMAL MAMMOGRAM: ICD-10-CM

## 2024-05-20 DIAGNOSIS — Z12.31 VISIT FOR SCREENING MAMMOGRAM: Primary | ICD-10-CM

## 2024-06-04 ENCOUNTER — OFFICE VISIT (OUTPATIENT)
Dept: FAMILY MEDICINE | Facility: CLINIC | Age: 64
End: 2024-06-04
Payer: COMMERCIAL

## 2024-06-04 VITALS
SYSTOLIC BLOOD PRESSURE: 152 MMHG | BODY MASS INDEX: 25.32 KG/M2 | OXYGEN SATURATION: 96 % | RESPIRATION RATE: 12 BRPM | HEIGHT: 60 IN | TEMPERATURE: 98.1 F | DIASTOLIC BLOOD PRESSURE: 92 MMHG | WEIGHT: 129 LBS | HEART RATE: 74 BPM

## 2024-06-04 DIAGNOSIS — I10 BENIGN ESSENTIAL HYPERTENSION: ICD-10-CM

## 2024-06-04 DIAGNOSIS — R73.03 PREDIABETES: ICD-10-CM

## 2024-06-04 DIAGNOSIS — E78.5 HYPERLIPIDEMIA LDL GOAL <130: ICD-10-CM

## 2024-06-04 LAB — HBA1C MFR BLD: 5.9 % (ref 0–5.6)

## 2024-06-04 PROCEDURE — 83036 HEMOGLOBIN GLYCOSYLATED A1C: CPT | Performed by: PHYSICIAN ASSISTANT

## 2024-06-04 PROCEDURE — 99213 OFFICE O/P EST LOW 20 MIN: CPT | Performed by: PHYSICIAN ASSISTANT

## 2024-06-04 PROCEDURE — 36415 COLL VENOUS BLD VENIPUNCTURE: CPT | Performed by: PHYSICIAN ASSISTANT

## 2024-06-04 PROCEDURE — 80053 COMPREHEN METABOLIC PANEL: CPT | Performed by: PHYSICIAN ASSISTANT

## 2024-06-04 PROCEDURE — 80061 LIPID PANEL: CPT | Performed by: PHYSICIAN ASSISTANT

## 2024-06-04 RX ORDER — SIMVASTATIN 10 MG
10 TABLET ORAL AT BEDTIME
Qty: 90 TABLET | Refills: 2 | Status: SHIPPED | OUTPATIENT
Start: 2024-06-04

## 2024-06-04 RX ORDER — LISINOPRIL 20 MG/1
20 TABLET ORAL DAILY
Qty: 90 TABLET | Refills: 2 | Status: SHIPPED | OUTPATIENT
Start: 2024-06-04

## 2024-06-04 NOTE — PROGRESS NOTES
Assessment & Plan     Benign essential hypertension  Not at goal but patient has been out of medication for the past month. Unclear why as there were refills placed in April. Refills provided today. Recheck labs as noted below.  - lisinopril (ZESTRIL) 20 MG tablet; Take 1 tablet (20 mg) by mouth daily  - Comprehensive metabolic panel (BMP + Alb, Alk Phos, ALT, AST, Total. Bili, TP); Future  - Comprehensive metabolic panel (BMP + Alb, Alk Phos, ALT, AST, Total. Bili, TP)    Hyperlipidemia LDL goal <130  As noted above.  - Lipid panel reflex to direct LDL Fasting; Future  - simvastatin (ZOCOR) 10 MG tablet; Take 1 tablet (10 mg) by mouth at bedtime  - Comprehensive metabolic panel (BMP + Alb, Alk Phos, ALT, AST, Total. Bili, TP); Future  - Lipid panel reflex to direct LDL Fasting  - Comprehensive metabolic panel (BMP + Alb, Alk Phos, ALT, AST, Total. Bili, TP)    Prediabetes  Ordered by Dr. Mamta Moy, PCP.  - Hemoglobin A1c    Letter with results    Subjective   Thi is a 63 year old, presenting for the following health issues:  Recheck Medication        6/4/2024     7:27 AM   Additional Questions   Roomed by Mary Jane CASTELLANO   Accompanied by     with iPad used for visit.    HPI     Hyperlipidemia Follow-Up    Are you regularly taking any medication or supplement to lower your cholesterol?   No-ran out of medication, has been off medication for 2 months  Are you having muscle aches or other side effects that you think could be caused by your cholesterol lowering medication?  No    Hypertension Follow-up    Do you check your blood pressure regularly outside of the clinic? Yes   Are you following a low salt diet? Yes  Are your blood pressures ever more than 140 on the top number (systolic) OR more   than 90 on the bottom number (diastolic), for example 140/90? Yes  How many servings of fruits and vegetables do you eat daily?  0-1  On average, how many sweetened beverages do you drink each day (Examples: soda,  juice, sweet tea, etc.  Do NOT count diet or artificially sweetened beverages)?   0  How many days per week do you exercise enough to make your heart beat faster? 7-walks at work a lot  How many minutes a day do you exercise enough to make your heart beat faster? 60 or more  How many days per week do you miss taking your medication? 2 months  What makes it hard for you to take your medications?   Ran out of medication        Objective    BP (!) 155/93 (BP Location: Left arm, Patient Position: Chair, Cuff Size: Adult Regular)   Pulse 74   Temp 98.1  F (36.7  C) (Oral)   Resp 12   Ht 1.524 m (5')   Wt 58.5 kg (129 lb)   SpO2 96%   BMI 25.19 kg/m    Body mass index is 25.19 kg/m .  Physical Exam   GENERAL: No acute distress  HEENT: Normocephalic  NEURO: Alert and non-focal          Signed Electronically by: Aletha Kowalski PA-C

## 2024-06-04 NOTE — LETTER
June 5, 2024      Uyen Rodney  [address] [facility] [address]        Dear ,    We are writing to inform you of your test results.    Electrolytes, kidney function and liver enzymes are normal.   Cholesterol levels are higher than ideal but you were out of medication thus we will not make any adjustments at this time. Please continue with current dosing.     Resulted Orders   Lipid panel reflex to direct LDL Fasting   Result Value Ref Range    Cholesterol 187 <200 mg/dL    Triglycerides 117 <150 mg/dL    Direct Measure HDL 36 (L) >=50 mg/dL    LDL Cholesterol Calculated 128 (H) <=100 mg/dL    Non HDL Cholesterol 151 (H) <130 mg/dL    Patient Fasting > 8hrs? Unknown     Narrative    Cholesterol  Desirable:  <200 mg/dL    Triglycerides  Normal:  Less than 150 mg/dL  Borderline High:  150-199 mg/dL  High:  200-499 mg/dL  Very High:  Greater than or equal to 500 mg/dL    Direct Measure HDL  Female:  Greater than or equal to 50 mg/dL   Male:  Greater than or equal to 40 mg/dL    LDL Cholesterol  Desirable:  <100mg/dL  Above Desirable:  100-129 mg/dL   Borderline High:  130-159 mg/dL   High:  160-189 mg/dL   Very High:  >= 190 mg/dL    Non HDL Cholesterol  Desirable:  130 mg/dL  Above Desirable:  130-159 mg/dL  Borderline High:  160-189 mg/dL  High:  190-219 mg/dL  Very High:  Greater than or equal to 220 mg/dL   Comprehensive metabolic panel (BMP + Alb, Alk Phos, ALT, AST, Total. Bili, TP)   Result Value Ref Range    Sodium 140 135 - 145 mmol/L      Comment:      Reference intervals for this test were updated on 09/26/2023 to more accurately reflect our healthy population. There may be differences in the flagging of prior results with similar values performed with this method. Interpretation of those prior results can be made in the context of the updated reference intervals.     Potassium 4.5 3.4 - 5.3 mmol/L    Carbon Dioxide (CO2) 25 22 - 29 mmol/L    Anion Gap 9 7 - 15 mmol/L    Urea Nitrogen  20.1 8.0 - 23.0 mg/dL    Creatinine 0.71 0.51 - 0.95 mg/dL    GFR Estimate >90 >60 mL/min/1.73m2    Calcium 9.3 8.8 - 10.2 mg/dL    Chloride 106 98 - 107 mmol/L    Glucose 94 70 - 99 mg/dL    Alkaline Phosphatase 81 40 - 150 U/L    AST 33 0 - 45 U/L      Comment:      Reference intervals for this test were updated on 6/12/2023 to more accurately reflect our healthy population. There may be differences in the flagging of prior results with similar values performed with this method. Interpretation of those prior results can be made in the context of the updated reference intervals.    ALT 27 0 - 50 U/L      Comment:      Reference intervals for this test were updated on 6/12/2023 to more accurately reflect our healthy population. There may be differences in the flagging of prior results with similar values performed with this method. Interpretation of those prior results can be made in the context of the updated reference intervals.      Protein Total 8.3 6.4 - 8.3 g/dL    Albumin 4.3 3.5 - 5.2 g/dL    Bilirubin Total 0.4 <=1.2 mg/dL    Patient Fasting > 8hrs? Unknown        If you have any questions or concerns, please call the clinic at the number listed above.       Sincerely,      Aletha Kowalski PA-C

## 2024-06-05 LAB
ALBUMIN SERPL BCG-MCNC: 4.3 G/DL (ref 3.5–5.2)
ALP SERPL-CCNC: 81 U/L (ref 40–150)
ALT SERPL W P-5'-P-CCNC: 27 U/L (ref 0–50)
ANION GAP SERPL CALCULATED.3IONS-SCNC: 9 MMOL/L (ref 7–15)
AST SERPL W P-5'-P-CCNC: 33 U/L (ref 0–45)
BILIRUB SERPL-MCNC: 0.4 MG/DL
BUN SERPL-MCNC: 20.1 MG/DL (ref 8–23)
CALCIUM SERPL-MCNC: 9.3 MG/DL (ref 8.8–10.2)
CHLORIDE SERPL-SCNC: 106 MMOL/L (ref 98–107)
CHOLEST SERPL-MCNC: 187 MG/DL
CREAT SERPL-MCNC: 0.71 MG/DL (ref 0.51–0.95)
DEPRECATED HCO3 PLAS-SCNC: 25 MMOL/L (ref 22–29)
EGFRCR SERPLBLD CKD-EPI 2021: >90 ML/MIN/1.73M2
FASTING STATUS PATIENT QL REPORTED: ABNORMAL
FASTING STATUS PATIENT QL REPORTED: NORMAL
GLUCOSE SERPL-MCNC: 94 MG/DL (ref 70–99)
HDLC SERPL-MCNC: 36 MG/DL
LDLC SERPL CALC-MCNC: 128 MG/DL
NONHDLC SERPL-MCNC: 151 MG/DL
POTASSIUM SERPL-SCNC: 4.5 MMOL/L (ref 3.4–5.3)
PROT SERPL-MCNC: 8.3 G/DL (ref 6.4–8.3)
SODIUM SERPL-SCNC: 140 MMOL/L (ref 135–145)
TRIGL SERPL-MCNC: 117 MG/DL

## 2024-06-11 ENCOUNTER — APPOINTMENT (OUTPATIENT)
Dept: INTERPRETER SERVICES | Facility: CLINIC | Age: 64
End: 2024-06-11
Payer: COMMERCIAL

## 2024-06-11 DIAGNOSIS — R73.03 PREDIABETES: Primary | ICD-10-CM

## 2024-06-11 RX ORDER — LANCETS
EACH MISCELLANEOUS
Qty: 100 EACH | Refills: 6 | Status: SHIPPED | OUTPATIENT
Start: 2024-06-11

## 2024-06-11 NOTE — TELEPHONE ENCOUNTER
RN contacted patient's daughter via  and informed script for glucometer and supplies were sent to [facility] pharmacy 6/11/24 and DM education referral has been placed. Gave number to call and schedule: [phone].     Brandon PERRIN RN 6/11/2024 at 2:42 PM

## 2024-06-11 NOTE — TELEPHONE ENCOUNTER
----- Message from April Courtney Moy MD sent at 6/10/2024  7:25 PM CDT -----  Please call and let her know the A1c is stable overall.    Would she like a glucometer to start checking her blood sugars?  Diabetes education referral? I would recommend both.

## 2024-06-11 NOTE — TELEPHONE ENCOUNTER
Patient gives verbal consent to speak with daughter.   Notified daughter of provider's message.     Patient is agreeable to diabetes education and glucometer. Orders pended for provider completion.    Person was given an opportunity to ask questions, verbalized understanding of plan, and is agreeable.     Caitlin Fall RN

## 2024-06-25 ENCOUNTER — ALLIED HEALTH/NURSE VISIT (OUTPATIENT)
Dept: FAMILY MEDICINE | Facility: CLINIC | Age: 64
End: 2024-06-25
Payer: COMMERCIAL

## 2024-06-25 VITALS — SYSTOLIC BLOOD PRESSURE: 136 MMHG | OXYGEN SATURATION: 98 % | DIASTOLIC BLOOD PRESSURE: 82 MMHG | HEART RATE: 70 BPM

## 2024-06-25 DIAGNOSIS — Z01.30 BP CHECK: Primary | ICD-10-CM

## 2024-06-25 PROCEDURE — 99207 PR NO CHARGE NURSE ONLY: CPT

## 2024-06-25 NOTE — PROGRESS NOTES
Uyen Rodney is a 63 year old patient who comes in today for a Blood Pressure check.  Initial BP:  /82 (BP Location: Right arm, Patient Position: Chair, Cuff Size: Adult Regular)   Pulse 70   SpO2 98%      70  Disposition: follow-up as previously indicated by provider and results routed to provider

## 2024-11-05 ENCOUNTER — TELEPHONE (OUTPATIENT)
Dept: FAMILY MEDICINE | Facility: CLINIC | Age: 64
End: 2024-11-05
Payer: COMMERCIAL

## 2024-11-05 NOTE — TELEPHONE ENCOUNTER
11/05/24 3:40pm Called   Services [phone] requested a Kyrgyz , unable to reach patient, left a message to call clinic, need to reschedule 12/23/24 appointment with Dr. Mamta Moy. Provider had a schedule change will not be in clinic on that day. Ruth Behrens.

## 2024-11-08 ENCOUNTER — TELEPHONE (OUTPATIENT)
Dept: FAMILY MEDICINE | Facility: CLINIC | Age: 64
End: 2024-11-08
Payer: COMMERCIAL

## 2024-11-08 DIAGNOSIS — R92.8 ABNORMAL MAMMOGRAM: Primary | ICD-10-CM

## 2024-11-08 DIAGNOSIS — N63.10 MASS OF RIGHT BREAST, UNSPECIFIED QUADRANT: ICD-10-CM

## 2024-11-08 DIAGNOSIS — Z12.31 VISIT FOR SCREENING MAMMOGRAM: ICD-10-CM

## 2024-11-08 NOTE — TELEPHONE ENCOUNTER
Called daughter Phi back, no CTC on file.  She is looking for mammo and U/S order.  Advised we would need to call patient back as we do not have CTC to give her any information.  Orders T'd up.  Please advise.  JESSICA Butts Rashaun  [id number] [facility] [id number]            November 20, 2023    Date of Exam:       Dear Uyen:    Thank you for your recent visit.    Breast Imaging Result: Your breast imaging examination showed an area that we believe is benign (not cancer). We recommend that you come back again prior to your next yearly breast imaging for short term follow-up in 1 year. If you have not already scheduled this follow-up exam, please call [phone].     As you know, early detection of cancer is very important. Although not all cancers are found through breast imaging it is the most accurate method for early detection. A thorough examination includes a combination of breast imaging and a physical examination by your health care professional. Therefore, if you have not had a recent physical exam of your breasts see your health care team.    A report of your breast imaging results was sent to: Aletha Kowalski    Your breast imaging will become part of your medical file here at [facility] for at least 10 years. You are responsible for informing any new health care team or breast imaging facility of the date and location of this examination.    We appreciate the opportunity to participate in your health care.    Sincerely,  Jorgito Chase MD  [facility]

## 2024-11-11 ENCOUNTER — PATIENT OUTREACH (OUTPATIENT)
Dept: CARE COORDINATION | Facility: CLINIC | Age: 64
End: 2024-11-11
Payer: COMMERCIAL

## 2024-11-18 ENCOUNTER — HOSPITAL ENCOUNTER (OUTPATIENT)
Dept: MAMMOGRAPHY | Facility: CLINIC | Age: 64
Discharge: HOME | End: 2024-11-18
Attending: FAMILY MEDICINE
Payer: COMMERCIAL

## 2024-11-18 ENCOUNTER — HOSPITAL ENCOUNTER (OUTPATIENT)
Dept: ULTRASOUND IMAGING | Facility: CLINIC | Age: 64
Discharge: HOME | End: 2024-11-18
Attending: FAMILY MEDICINE
Payer: COMMERCIAL

## 2024-11-18 DIAGNOSIS — N63.10 MASS OF RIGHT BREAST, UNSPECIFIED QUADRANT: ICD-10-CM

## 2024-11-18 DIAGNOSIS — Z12.31 VISIT FOR SCREENING MAMMOGRAM: ICD-10-CM

## 2024-11-18 PROCEDURE — 77066 DX MAMMO INCL CAD BI: CPT

## 2024-11-18 PROCEDURE — 76642 ULTRASOUND BREAST LIMITED: CPT | Mod: RT

## 2025-02-27 DIAGNOSIS — E78.5 HYPERLIPIDEMIA LDL GOAL <130: ICD-10-CM

## 2025-02-27 DIAGNOSIS — I10 BENIGN ESSENTIAL HYPERTENSION: ICD-10-CM

## 2025-02-27 RX ORDER — LISINOPRIL 20 MG/1
20 TABLET ORAL DAILY
Qty: 90 TABLET | Refills: 0 | Status: SHIPPED | OUTPATIENT
Start: 2025-02-27

## 2025-02-27 RX ORDER — SIMVASTATIN 10 MG
10 TABLET ORAL AT BEDTIME
Qty: 90 TABLET | Refills: 0 | Status: SHIPPED | OUTPATIENT
Start: 2025-02-27

## 2025-03-06 PROCEDURE — 88342 IMHCHEM/IMCYTCHM 1ST ANTB: CPT | Mod: TC | Performed by: OBSTETRICS & GYNECOLOGY

## 2025-03-06 PROCEDURE — 88305 TISSUE EXAM BY PATHOLOGIST: CPT | Mod: TC | Performed by: OBSTETRICS & GYNECOLOGY

## 2025-04-03 ENCOUNTER — APPOINTMENT (OUTPATIENT)
Dept: INTERPRETER SERVICES | Facility: CLINIC | Age: 65
End: 2025-04-03
Payer: COMMERCIAL

## 2025-04-03 ENCOUNTER — PATIENT OUTREACH (OUTPATIENT)
Dept: ONCOLOGY | Facility: CLINIC | Age: 65
End: 2025-04-03
Payer: COMMERCIAL

## 2025-04-03 NOTE — PROGRESS NOTES
Padma Lubin MD Crookes, Valerie, RN  Reviewed.  RN to notify patient of results via telephone (Tamazight , okay to leave a detailed message):  Vaginal cytology is normal, HPV test is negative, and biopsy is negative. Per guidelines, we will extend the follow-up interval to 3 years (follow-up March 2028). I have changed the request, and the clinic will call in 3 years to schedule follow-up.    Padma Lubin MD, MS, FACOG, FACS  3/17/2025  10:40 AM    Writer called patient using  services - [employer] .  Reviewed above message from Dr. Lubin with patient and her daughter.  They voiced understanding instructions.  No further questions or concerns at this time.  Encouraged them to call us back with any further questions or concerns.    Qian Rice, RN, BSN    RN Care Coordinator  [facility]  [phone]

## 2025-05-30 DIAGNOSIS — E78.5 HYPERLIPIDEMIA LDL GOAL <130: ICD-10-CM

## 2025-05-30 DIAGNOSIS — I10 BENIGN ESSENTIAL HYPERTENSION: ICD-10-CM

## 2025-05-30 RX ORDER — LISINOPRIL 20 MG/1
20 TABLET ORAL DAILY
Qty: 90 TABLET | Refills: 0 | OUTPATIENT
Start: 2025-05-30

## 2025-05-30 RX ORDER — SIMVASTATIN 10 MG
10 TABLET ORAL AT BEDTIME
Qty: 90 TABLET | Refills: 0 | Status: SHIPPED | OUTPATIENT
Start: 2025-05-30

## 2025-06-02 NOTE — TELEPHONE ENCOUNTER
Patient scheduled for: Med check 6/6/2025 with Josue Root and [facility] on 8/12/2025 with Dr. Mamta Moy.  Lexus Benavidez, TC

## 2025-06-07 ENCOUNTER — RESULTS FOLLOW-UP (OUTPATIENT)
Dept: FAMILY MEDICINE | Facility: CLINIC | Age: 65
End: 2025-06-07

## 2025-08-12 ENCOUNTER — OFFICE VISIT (OUTPATIENT)
Dept: FAMILY MEDICINE | Facility: CLINIC | Age: 65
End: 2025-08-12
Payer: COMMERCIAL

## 2025-08-12 VITALS
HEIGHT: 60 IN | WEIGHT: 124 LBS | HEART RATE: 70 BPM | BODY MASS INDEX: 24.35 KG/M2 | SYSTOLIC BLOOD PRESSURE: 108 MMHG | TEMPERATURE: 98.4 F | RESPIRATION RATE: 12 BRPM | DIASTOLIC BLOOD PRESSURE: 63 MMHG | OXYGEN SATURATION: 99 %

## 2025-08-12 DIAGNOSIS — Z23 NEED FOR VACCINATION: ICD-10-CM

## 2025-08-12 DIAGNOSIS — Z78.0 POSTMENOPAUSAL STATUS: ICD-10-CM

## 2025-08-12 DIAGNOSIS — Z00.00 ROUTINE GENERAL MEDICAL EXAMINATION AT A HEALTH CARE FACILITY: Primary | ICD-10-CM

## 2025-08-12 DIAGNOSIS — R73.03 PREDIABETES: ICD-10-CM

## 2025-08-12 DIAGNOSIS — I10 BENIGN ESSENTIAL HYPERTENSION: ICD-10-CM

## 2025-08-12 DIAGNOSIS — E78.5 HYPERLIPIDEMIA LDL GOAL <130: ICD-10-CM

## 2025-08-12 DIAGNOSIS — D64.9 NORMOCYTIC ANEMIA: ICD-10-CM

## 2025-08-12 LAB
ERYTHROCYTE [DISTWIDTH] IN BLOOD BY AUTOMATED COUNT: 12.1 % (ref 10–15)
EST. AVERAGE GLUCOSE BLD GHB EST-MCNC: 117 MG/DL
HBA1C MFR BLD: 5.7 % (ref 0–5.6)
HCT VFR BLD AUTO: 33.2 % (ref 35–47)
HGB BLD-MCNC: 11.1 G/DL (ref 11.7–15.7)
MCH RBC QN AUTO: 30.3 PG (ref 26.5–33)
MCHC RBC AUTO-ENTMCNC: 33.4 G/DL (ref 31.5–36.5)
MCV RBC AUTO: 90.7 FL (ref 78–100)
PLATELET # BLD AUTO: 272 10E3/UL (ref 150–450)
RBC # BLD AUTO: 3.66 10E6/UL (ref 3.8–5.2)
WBC # BLD AUTO: 8.2 10E3/UL (ref 4–11)

## 2025-08-12 PROCEDURE — 84443 ASSAY THYROID STIM HORMONE: CPT | Performed by: FAMILY MEDICINE

## 2025-08-12 PROCEDURE — 83540 ASSAY OF IRON: CPT | Performed by: FAMILY MEDICINE

## 2025-08-12 PROCEDURE — 3074F SYST BP LT 130 MM HG: CPT | Performed by: FAMILY MEDICINE

## 2025-08-12 PROCEDURE — 83550 IRON BINDING TEST: CPT | Performed by: FAMILY MEDICINE

## 2025-08-12 PROCEDURE — 82728 ASSAY OF FERRITIN: CPT | Performed by: FAMILY MEDICINE

## 2025-08-12 PROCEDURE — 91320 SARSCV2 VAC 30MCG TRS-SUC IM: CPT | Performed by: FAMILY MEDICINE

## 2025-08-12 PROCEDURE — 90677 PCV20 VACCINE IM: CPT | Performed by: FAMILY MEDICINE

## 2025-08-12 PROCEDURE — 90480 ADMN SARSCOV2 VAC 1/ONLY CMP: CPT | Performed by: FAMILY MEDICINE

## 2025-08-12 PROCEDURE — 85027 COMPLETE CBC AUTOMATED: CPT | Performed by: FAMILY MEDICINE

## 2025-08-12 PROCEDURE — 3044F HG A1C LEVEL LT 7.0%: CPT | Performed by: FAMILY MEDICINE

## 2025-08-12 PROCEDURE — 99397 PER PM REEVAL EST PAT 65+ YR: CPT | Mod: 25 | Performed by: FAMILY MEDICINE

## 2025-08-12 PROCEDURE — 3078F DIAST BP <80 MM HG: CPT | Performed by: FAMILY MEDICINE

## 2025-08-12 PROCEDURE — 99213 OFFICE O/P EST LOW 20 MIN: CPT | Mod: 25 | Performed by: FAMILY MEDICINE

## 2025-08-12 PROCEDURE — 36415 COLL VENOUS BLD VENIPUNCTURE: CPT | Performed by: FAMILY MEDICINE

## 2025-08-12 PROCEDURE — G2211 COMPLEX E/M VISIT ADD ON: HCPCS | Performed by: FAMILY MEDICINE

## 2025-08-12 PROCEDURE — 90471 IMMUNIZATION ADMIN: CPT | Performed by: FAMILY MEDICINE

## 2025-08-12 PROCEDURE — 83036 HEMOGLOBIN GLYCOSYLATED A1C: CPT | Performed by: FAMILY MEDICINE

## 2025-08-12 SDOH — HEALTH STABILITY: PHYSICAL HEALTH: ON AVERAGE, HOW MANY DAYS PER WEEK DO YOU ENGAGE IN MODERATE TO STRENUOUS EXERCISE (LIKE A BRISK WALK)?: 5 DAYS

## 2025-08-12 SDOH — HEALTH STABILITY: PHYSICAL HEALTH: ON AVERAGE, HOW MANY MINUTES DO YOU ENGAGE IN EXERCISE AT THIS LEVEL?: 60 MIN

## 2025-08-12 ASSESSMENT — SOCIAL DETERMINANTS OF HEALTH (SDOH): HOW OFTEN DO YOU GET TOGETHER WITH FRIENDS OR RELATIVES?: MORE THAN THREE TIMES A WEEK

## 2025-08-13 ENCOUNTER — RESULTS FOLLOW-UP (OUTPATIENT)
Dept: FAMILY MEDICINE | Facility: CLINIC | Age: 65
End: 2025-08-13
Payer: COMMERCIAL

## 2025-08-13 DIAGNOSIS — D64.9 NORMOCYTIC ANEMIA: Primary | ICD-10-CM

## 2025-08-13 LAB
FERRITIN SERPL-MCNC: 510 NG/ML (ref 11–328)
IRON BINDING CAPACITY (ROCHE): 285 UG/DL (ref 240–430)
IRON SATN MFR SERPL: 23 % (ref 15–46)
IRON SERPL-MCNC: 66 UG/DL (ref 37–145)
TSH SERPL DL<=0.005 MIU/L-ACNC: 2.11 UIU/ML (ref 0.3–4.2)

## 2025-08-14 ENCOUNTER — APPOINTMENT (OUTPATIENT)
Dept: INTERPRETER SERVICES | Facility: CLINIC | Age: 65
End: 2025-08-14
Payer: COMMERCIAL

## (undated) DEVICE — SOL NACL 0.9% INJ 1000ML BAG 2B1324X

## (undated) DEVICE — ENDO TROCAR CONMED AIRSEAL BLADELESS 05X120MM IAS5-120LP

## (undated) DEVICE — CLIP HEMOSTASIS ASSURANCE W16 MM BX00711884

## (undated) DEVICE — CATH TRAY FOLEY SURESTEP 16FR DRAIN BAG STATOCK A899916

## (undated) DEVICE — SU VICRYL 0 CT 36" J358H

## (undated) DEVICE — ESU CORD MONOPOLAR 10'  E0510

## (undated) DEVICE — GLOVE PROTEXIS BLUE W/NEU-THERA 7.0  2D73EB70

## (undated) DEVICE — ESU GROUND PAD ADULT W/CORD E7507

## (undated) DEVICE — LUBRICANT INST ELECTROLUBE EL101

## (undated) DEVICE — RX ELEVIEW SUBMUCOSAL ING 10ML AMP 05391530190015

## (undated) DEVICE — SUCTION IRR STRYKERFLOW II W/TIP 250-070-520

## (undated) DEVICE — ESU CORD BIPOLAR GREEN 10-4000

## (undated) DEVICE — SURGICEL POWDER ABSORBABLE HEMOSTAT 3GM 3013SP

## (undated) DEVICE — TUBING CONMED AIRSEAL SMOKE EVAC INSUFFLATION ASM-EVAC

## (undated) DEVICE — RETR ELEV / UTERINE MANIPULATOR V-CARE MED CUP 60-6085-201A

## (undated) DEVICE — SUCTION MANIFOLD NEPTUNE 2 SYS 4 PORT 0702-020-000

## (undated) DEVICE — SU MONOCRYL 4-0 PS-2 27" UND Y426H

## (undated) DEVICE — NDL INSUFFLATION 13GA 120MM C2201

## (undated) DEVICE — PROTECTOR ARM ONE-STEP TRENDELENBURG 40418

## (undated) DEVICE — SYR 03ML LL W/O NDL 309657

## (undated) DEVICE — GLOVE PROTEXIS W/NEU-THERA 6.5  2D73TE65

## (undated) DEVICE — ADH SKIN CLOSURE PREMIERPRO EXOFIN 1.0ML 3470

## (undated) DEVICE — APPLICATOR ENDOSCOPIC 5 SURGICEL POWDER 3123SPEA

## (undated) DEVICE — SYR 10ML FINGER CONTROL W/O NDL 309695

## (undated) DEVICE — SYSTEM CLEARIFY VISUALIZATION 21-345

## (undated) DEVICE — SU WND CLOSURE VLOC 180 ABS 0 9" GS-21 VLOCL0346

## (undated) DEVICE — PAD CHUX UNDERPAD 30X36" P3036C

## (undated) DEVICE — DAVINCI S CANNULA SEAL 8.5-13MM 420206

## (undated) DEVICE — KIT ENDO TURNOVER/PROCEDURE W/CLEAN A SCOPE LINERS 103888

## (undated) DEVICE — DAVINCI DRAPE KIT 4-ARM 420291

## (undated) DEVICE — PACK DAVINCI GYN SMA15GDFS1

## (undated) DEVICE — DAVINCI HOT SHEARS TIP COVER  400180

## (undated) DEVICE — DAVINCI OBTURATOR 8MM BLADELESS 420023

## (undated) DEVICE — SOL WATER IRRIG 1000ML BOTTLE 2F7114

## (undated) DEVICE — KIT PATIENT POSITIONING PIGAZZI LATEX FREE 40580

## (undated) DEVICE — LINEN TOWEL PACK X10 5473

## (undated) DEVICE — LINEN ORTHO ACL PACK 5447

## (undated) RX ORDER — ACETAMINOPHEN 325 MG/1
TABLET ORAL
Status: DISPENSED
Start: 2021-12-22

## (undated) RX ORDER — FENTANYL CITRATE 50 UG/ML
INJECTION, SOLUTION INTRAMUSCULAR; INTRAVENOUS
Status: DISPENSED
Start: 2021-12-22

## (undated) RX ORDER — LIDOCAINE HYDROCHLORIDE 10 MG/ML
INJECTION, SOLUTION EPIDURAL; INFILTRATION; INTRACAUDAL; PERINEURAL
Status: DISPENSED
Start: 2021-12-22

## (undated) RX ORDER — PHENAZOPYRIDINE HYDROCHLORIDE 200 MG/1
TABLET, FILM COATED ORAL
Status: DISPENSED
Start: 2021-12-22

## (undated) RX ORDER — FENTANYL CITRATE 50 UG/ML
INJECTION, SOLUTION INTRAMUSCULAR; INTRAVENOUS
Status: DISPENSED
Start: 2023-01-16

## (undated) RX ORDER — NEOSTIGMINE METHYLSULFATE 1 MG/ML
VIAL (ML) INJECTION
Status: DISPENSED
Start: 2021-12-22

## (undated) RX ORDER — GLYCOPYRROLATE 0.2 MG/ML
INJECTION INTRAMUSCULAR; INTRAVENOUS
Status: DISPENSED
Start: 2021-12-22

## (undated) RX ORDER — CEFAZOLIN SODIUM/WATER 2 G/20 ML
SYRINGE (ML) INTRAVENOUS
Status: DISPENSED
Start: 2021-12-22

## (undated) RX ORDER — PROPOFOL 10 MG/ML
INJECTION, EMULSION INTRAVENOUS
Status: DISPENSED
Start: 2021-12-22

## (undated) RX ORDER — FENTANYL CITRATE-0.9 % NACL/PF 10 MCG/ML
PLASTIC BAG, INJECTION (ML) INTRAVENOUS
Status: DISPENSED
Start: 2021-12-22

## (undated) RX ORDER — DEXAMETHASONE SODIUM PHOSPHATE 4 MG/ML
INJECTION, SOLUTION INTRA-ARTICULAR; INTRALESIONAL; INTRAMUSCULAR; INTRAVENOUS; SOFT TISSUE
Status: DISPENSED
Start: 2021-12-22

## (undated) RX ORDER — EPHEDRINE SULFATE 50 MG/ML
INJECTION, SOLUTION INTRAMUSCULAR; INTRAVENOUS; SUBCUTANEOUS
Status: DISPENSED
Start: 2021-12-22

## (undated) RX ORDER — OXYCODONE HYDROCHLORIDE 5 MG/1
TABLET ORAL
Status: DISPENSED
Start: 2021-12-22

## (undated) RX ORDER — SIMETHICONE 40MG/0.6ML
SUSPENSION, DROPS(FINAL DOSAGE FORM)(ML) ORAL
Status: DISPENSED
Start: 2023-01-16